# Patient Record
Sex: MALE | Race: WHITE | Employment: FULL TIME | URBAN - METROPOLITAN AREA
[De-identification: names, ages, dates, MRNs, and addresses within clinical notes are randomized per-mention and may not be internally consistent; named-entity substitution may affect disease eponyms.]

---

## 2017-06-01 PROBLEM — I20.0 UNSTABLE ANGINA (HCC): Status: ACTIVE | Noted: 2017-06-01

## 2017-09-19 ENCOUNTER — HOSPITAL ENCOUNTER (OUTPATIENT)
Dept: CT IMAGING | Age: 62
Discharge: HOME | End: 2017-09-19
Attending: SPECIALIST | Admitting: SPECIALIST

## 2017-09-19 DIAGNOSIS — R31.29 HEMATURIA, MICROSCOPIC: ICD-10-CM

## 2017-09-19 DIAGNOSIS — R31.29 OTHER MICROSCOPIC HEMATURIA: ICD-10-CM

## 2017-09-19 RX ORDER — SODIUM CHLORIDE 0.9 % (FLUSH) 0.9 %
10 SYRINGE (ML) INJECTION ONCE
Status: COMPLETED | OUTPATIENT
Start: 2017-09-19 | End: 2017-09-19

## 2017-09-19 RX ADMIN — Medication 10 ML: at 09:20

## 2018-09-21 ENCOUNTER — HOSPITAL ENCOUNTER (OUTPATIENT)
Dept: EMERGENCY DEPT | Age: 63
Setting detail: OBSERVATION
Discharge: HOME | End: 2018-09-22
Attending: EMERGENCY MEDICINE | Admitting: HOSPITALIST
Payer: COMMERCIAL

## 2018-09-21 DIAGNOSIS — R07.9 CHEST PAIN, UNSPECIFIED TYPE: Primary | ICD-10-CM

## 2018-09-21 LAB
ALBUMIN SERPL-MCNC: 4 GM/DL (ref 3.4–5)
ALP BLD-CCNC: 97 IU/L (ref 40–129)
ALT SERPL-CCNC: 29 U/L (ref 10–40)
ANION GAP SERPL CALCULATED.3IONS-SCNC: 11 MMOL/L (ref 4–16)
AST SERPL-CCNC: 20 IU/L (ref 15–37)
BASOPHILS ABSOLUTE: 0.1 K/CU MM
BASOPHILS RELATIVE PERCENT: 0.6 % (ref 0–1)
BILIRUB SERPL-MCNC: 0.5 MG/DL (ref 0–1)
BUN BLDV-MCNC: 19 MG/DL (ref 6–23)
CALCIUM SERPL-MCNC: 8.9 MG/DL (ref 8.3–10.6)
CHLORIDE BLD-SCNC: 103 MMOL/L (ref 99–110)
CO2: 25 MMOL/L (ref 21–32)
CREAT SERPL-MCNC: 1.2 MG/DL (ref 0.9–1.3)
DIFFERENTIAL TYPE: ABNORMAL
EOSINOPHILS ABSOLUTE: 0.1 K/CU MM
EOSINOPHILS RELATIVE PERCENT: 1.1 % (ref 0–3)
GFR AFRICAN AMERICAN: >60 ML/MIN/1.73M2
GFR NON-AFRICAN AMERICAN: >60 ML/MIN/1.73M2
GLUCOSE BLD-MCNC: 170 MG/DL (ref 70–99)
HCT VFR BLD CALC: 46.6 % (ref 42–52)
HEMOGLOBIN: 15.7 GM/DL (ref 13.5–18)
IMMATURE NEUTROPHIL %: 0.4 % (ref 0–0.43)
LYMPHOCYTES ABSOLUTE: 2.3 K/CU MM
LYMPHOCYTES RELATIVE PERCENT: 27.6 % (ref 24–44)
MCH RBC QN AUTO: 31.6 PG (ref 27–31)
MCHC RBC AUTO-ENTMCNC: 33.7 % (ref 32–36)
MCV RBC AUTO: 93.8 FL (ref 78–100)
MONOCYTES ABSOLUTE: 0.6 K/CU MM
MONOCYTES RELATIVE PERCENT: 7.2 % (ref 0–4)
NUCLEATED RBC %: 0 %
PDW BLD-RTO: 11.8 % (ref 11.7–14.9)
PLATELET # BLD: 247 K/CU MM (ref 140–440)
PMV BLD AUTO: 10 FL (ref 7.5–11.1)
POTASSIUM SERPL-SCNC: 4.2 MMOL/L (ref 3.5–5.1)
RBC # BLD: 4.97 M/CU MM (ref 4.6–6.2)
SEGMENTED NEUTROPHILS ABSOLUTE COUNT: 5.2 K/CU MM
SEGMENTED NEUTROPHILS RELATIVE PERCENT: 63.1 % (ref 36–66)
SODIUM BLD-SCNC: 139 MMOL/L (ref 135–145)
TOTAL IMMATURE NEUTOROPHIL: 0.03 K/CU MM
TOTAL NUCLEATED RBC: 0 K/CU MM
TOTAL PROTEIN: 7 GM/DL (ref 6.4–8.2)
TROPONIN T: <0.01 NG/ML
WBC # BLD: 8.2 K/CU MM (ref 4–10.5)

## 2018-09-21 PROCEDURE — 71046 X-RAY EXAM CHEST 2 VIEWS: CPT

## 2018-09-21 PROCEDURE — G0480 DRUG TEST DEF 1-7 CLASSES: HCPCS

## 2018-09-21 PROCEDURE — 9990 CHARGE CONVERSION

## 2018-09-21 PROCEDURE — 93005 ELECTROCARDIOGRAM TRACING: CPT

## 2018-09-21 PROCEDURE — 36415 COLL VENOUS BLD VENIPUNCTURE: CPT

## 2018-09-21 PROCEDURE — G0378 HOSPITAL OBSERVATION PER HR: HCPCS

## 2018-09-21 PROCEDURE — 85025 COMPLETE CBC W/AUTO DIFF WBC: CPT

## 2018-09-21 PROCEDURE — 99285 EMERGENCY DEPT VISIT HI MDM: CPT

## 2018-09-21 PROCEDURE — 80053 COMPREHEN METABOLIC PANEL: CPT

## 2018-09-21 PROCEDURE — S0028 INJECTION, FAMOTIDINE, 20 MG: HCPCS

## 2018-09-21 PROCEDURE — 84484 ASSAY OF TROPONIN QUANT: CPT

## 2018-09-21 RX ORDER — CARVEDILOL 12.5 MG/1
12.5 TABLET ORAL 2 TIMES DAILY WITH MEALS
Status: DISCONTINUED | OUTPATIENT
Start: 2018-09-22 | End: 2018-09-22 | Stop reason: HOSPADM

## 2018-09-21 RX ORDER — DOCUSATE SODIUM 100 MG/1
100 CAPSULE, LIQUID FILLED ORAL 2 TIMES DAILY PRN
Status: DISCONTINUED | OUTPATIENT
Start: 2018-09-21 | End: 2018-09-22 | Stop reason: HOSPADM

## 2018-09-21 RX ORDER — CLOPIDOGREL BISULFATE 75 MG/1
75 TABLET ORAL DAILY
Status: DISCONTINUED | OUTPATIENT
Start: 2018-09-22 | End: 2018-09-22 | Stop reason: HOSPADM

## 2018-09-21 RX ORDER — ASPIRIN 81 MG/1
81 TABLET, CHEWABLE ORAL DAILY
Status: DISCONTINUED | OUTPATIENT
Start: 2018-09-22 | End: 2018-09-21 | Stop reason: SDUPTHER

## 2018-09-21 RX ORDER — NITROGLYCERIN 0.4 MG/1
0.4 TABLET SUBLINGUAL EVERY 5 MIN PRN
Status: DISCONTINUED | OUTPATIENT
Start: 2018-09-21 | End: 2018-09-22 | Stop reason: HOSPADM

## 2018-09-21 RX ORDER — SODIUM CHLORIDE 0.9 % (FLUSH) 0.9 %
10 SYRINGE (ML) INJECTION PRN
Status: DISCONTINUED | OUTPATIENT
Start: 2018-09-21 | End: 2018-09-22 | Stop reason: HOSPADM

## 2018-09-21 RX ORDER — ATORVASTATIN CALCIUM 10 MG/1
10 TABLET, FILM COATED ORAL NIGHTLY
Status: DISCONTINUED | OUTPATIENT
Start: 2018-09-21 | End: 2018-09-22 | Stop reason: HOSPADM

## 2018-09-21 RX ORDER — ACETAMINOPHEN 325 MG/1
650 TABLET ORAL EVERY 4 HOURS PRN
Status: DISCONTINUED | OUTPATIENT
Start: 2018-09-21 | End: 2018-09-22 | Stop reason: HOSPADM

## 2018-09-21 RX ORDER — ASPIRIN 81 MG/1
324 TABLET, CHEWABLE ORAL ONCE
Status: COMPLETED | OUTPATIENT
Start: 2018-09-21 | End: 2018-09-21

## 2018-09-21 RX ORDER — IPRATROPIUM BROMIDE AND ALBUTEROL SULFATE 2.5; .5 MG/3ML; MG/3ML
1 SOLUTION RESPIRATORY (INHALATION)
Status: DISCONTINUED | OUTPATIENT
Start: 2018-09-22 | End: 2018-09-22

## 2018-09-21 RX ORDER — SODIUM CHLORIDE 9 MG/ML
INJECTION, SOLUTION INTRAVENOUS CONTINUOUS
Status: DISCONTINUED | OUTPATIENT
Start: 2018-09-21 | End: 2018-09-22 | Stop reason: HOSPADM

## 2018-09-21 RX ORDER — RANOLAZINE 500 MG/1
1000 TABLET, EXTENDED RELEASE ORAL 2 TIMES DAILY
Status: DISCONTINUED | OUTPATIENT
Start: 2018-09-21 | End: 2018-09-22 | Stop reason: HOSPADM

## 2018-09-21 RX ORDER — SODIUM CHLORIDE 0.9 % (FLUSH) 0.9 %
10 SYRINGE (ML) INJECTION EVERY 12 HOURS SCHEDULED
Status: DISCONTINUED | OUTPATIENT
Start: 2018-09-21 | End: 2018-09-22 | Stop reason: HOSPADM

## 2018-09-21 RX ORDER — ONDANSETRON 2 MG/ML
4 INJECTION INTRAMUSCULAR; INTRAVENOUS EVERY 6 HOURS PRN
Status: DISCONTINUED | OUTPATIENT
Start: 2018-09-21 | End: 2018-09-22 | Stop reason: HOSPADM

## 2018-09-21 RX ORDER — ASPIRIN 81 MG/1
81 TABLET ORAL DAILY
Status: DISCONTINUED | OUTPATIENT
Start: 2018-09-22 | End: 2018-09-22 | Stop reason: HOSPADM

## 2018-09-21 RX ADMIN — NITROGLYCERIN 0.4 MG: 0.4 TABLET SUBLINGUAL at 22:20

## 2018-09-21 RX ADMIN — Medication 10 ML: at 23:35

## 2018-09-21 RX ADMIN — ASPIRIN 324 MG: 81 TABLET, CHEWABLE ORAL at 22:20

## 2018-09-21 RX ADMIN — RANOLAZINE 1000 MG: 500 TABLET, FILM COATED, EXTENDED RELEASE ORAL at 23:35

## 2018-09-21 RX ADMIN — ATORVASTATIN CALCIUM 10 MG: 10 TABLET, FILM COATED ORAL at 23:35

## 2018-09-21 RX ADMIN — SODIUM CHLORIDE: 9 INJECTION, SOLUTION INTRAVENOUS at 23:35

## 2018-09-21 ASSESSMENT — PAIN SCALES - GENERAL
PAINLEVEL_OUTOF10: 3
PAINLEVEL_OUTOF10: 0

## 2018-09-22 VITALS
BODY MASS INDEX: 28.63 KG/M2 | SYSTOLIC BLOOD PRESSURE: 144 MMHG | HEIGHT: 70 IN | RESPIRATION RATE: 15 BRPM | OXYGEN SATURATION: 93 % | DIASTOLIC BLOOD PRESSURE: 83 MMHG | TEMPERATURE: 97.8 F | WEIGHT: 200 LBS | HEART RATE: 78 BPM

## 2018-09-22 LAB
ALBUMIN SERPL-MCNC: 3.8 GM/DL (ref 3.4–5)
ALP BLD-CCNC: 86 IU/L (ref 40–128)
ALT SERPL-CCNC: 26 U/L (ref 10–40)
AMPHETAMINES: NEGATIVE
ANION GAP SERPL CALCULATED.3IONS-SCNC: 12 MMOL/L (ref 4–16)
AST SERPL-CCNC: 19 IU/L (ref 15–37)
BARBITURATE SCREEN URINE: NEGATIVE
BASOPHILS ABSOLUTE: 0.1 K/CU MM
BASOPHILS RELATIVE PERCENT: 0.6 % (ref 0–1)
BENZODIAZEPINE SCREEN, URINE: NEGATIVE
BILIRUB SERPL-MCNC: 0.4 MG/DL (ref 0–1)
BUN BLDV-MCNC: 21 MG/DL (ref 6–23)
CALCIUM SERPL-MCNC: 8.7 MG/DL (ref 8.3–10.6)
CANNABINOID SCREEN URINE: NEGATIVE
CHLORIDE BLD-SCNC: 103 MMOL/L (ref 99–110)
CHOLESTEROL: 217 MG/DL
CO2: 24 MMOL/L (ref 21–32)
COCAINE METABOLITE: NEGATIVE
CREAT SERPL-MCNC: 1 MG/DL (ref 0.9–1.3)
D DIMER: <200 NG/ML(DDU)
DIFFERENTIAL TYPE: ABNORMAL
EKG ATRIAL RATE: 96 BPM
EKG DIAGNOSIS: NORMAL
EKG P AXIS: 43 DEGREES
EKG P-R INTERVAL: 162 MS
EKG Q-T INTERVAL: 364 MS
EKG QRS DURATION: 74 MS
EKG QTC CALCULATION (BAZETT): 459 MS
EKG R AXIS: 7 DEGREES
EKG T AXIS: 116 DEGREES
EKG VENTRICULAR RATE: 96 BPM
EOSINOPHILS ABSOLUTE: 0.1 K/CU MM
EOSINOPHILS RELATIVE PERCENT: 1.3 % (ref 0–3)
GFR AFRICAN AMERICAN: >60 ML/MIN/1.73M2
GFR NON-AFRICAN AMERICAN: >60 ML/MIN/1.73M2
GLUCOSE BLD-MCNC: 115 MG/DL (ref 70–99)
HCT VFR BLD CALC: 44 % (ref 42–52)
HDLC SERPL-MCNC: 45 MG/DL
HEMOGLOBIN: 14.7 GM/DL (ref 13.5–18)
IMMATURE NEUTROPHIL %: 1 % (ref 0–0.43)
LDL CHOLESTEROL DIRECT: 163 MG/DL
LYMPHOCYTES ABSOLUTE: 2.4 K/CU MM
LYMPHOCYTES RELATIVE PERCENT: 29 % (ref 24–44)
MCH RBC QN AUTO: 31.7 PG (ref 27–31)
MCHC RBC AUTO-ENTMCNC: 33.4 % (ref 32–36)
MCV RBC AUTO: 94.8 FL (ref 78–100)
MONOCYTES ABSOLUTE: 0.7 K/CU MM
MONOCYTES RELATIVE PERCENT: 8.1 % (ref 0–4)
NUCLEATED RBC %: 0 %
OPIATES, URINE: NEGATIVE
OXYCODONE: NEGATIVE
PDW BLD-RTO: 11.8 % (ref 11.7–14.9)
PHENCYCLIDINE, URINE: NEGATIVE
PLATELET # BLD: 238 K/CU MM (ref 140–440)
PMV BLD AUTO: 10.3 FL (ref 7.5–11.1)
POTASSIUM SERPL-SCNC: 4.1 MMOL/L (ref 3.5–5.1)
RBC # BLD: 4.64 M/CU MM (ref 4.6–6.2)
SEGMENTED NEUTROPHILS ABSOLUTE COUNT: 5 K/CU MM
SEGMENTED NEUTROPHILS RELATIVE PERCENT: 60 % (ref 36–66)
SODIUM BLD-SCNC: 139 MMOL/L (ref 135–145)
TOTAL IMMATURE NEUTOROPHIL: 0.08 K/CU MM
TOTAL NUCLEATED RBC: 0 K/CU MM
TOTAL PROTEIN: 5.8 GM/DL (ref 6.4–8.2)
TRIGL SERPL-MCNC: 154 MG/DL
TROPONIN T: <0.01 NG/ML
WBC # BLD: 8.3 K/CU MM (ref 4–10.5)

## 2018-09-22 PROCEDURE — 84484 ASSAY OF TROPONIN QUANT: CPT | Performed by: HOSPITALIST

## 2018-09-22 PROCEDURE — 6370000000 HC RX 637 (ALT 250 FOR IP): Performed by: INTERNAL MEDICINE

## 2018-09-22 PROCEDURE — 82962 GLUCOSE BLOOD TEST: CPT | Performed by: HOSPITALIST

## 2018-09-22 PROCEDURE — 36415 COLL VENOUS BLD VENIPUNCTURE: CPT | Performed by: HOSPITALIST

## 2018-09-22 PROCEDURE — 85379 FIBRIN DEGRADATION QUANT: CPT | Performed by: HOSPITALIST

## 2018-09-22 PROCEDURE — 83721 ASSAY OF BLOOD LIPOPROTEIN: CPT | Performed by: HOSPITALIST

## 2018-09-22 PROCEDURE — 96365 THER/PROPH/DIAG IV INF INIT: CPT

## 2018-09-22 PROCEDURE — 6370000000 HC RX 637 (ALT 250 FOR IP): Performed by: NURSE PRACTITIONER

## 2018-09-22 PROCEDURE — 85025 COMPLETE CBC W/AUTO DIFF WBC: CPT | Performed by: HOSPITALIST

## 2018-09-22 PROCEDURE — 80053 COMPREHEN METABOLIC PANEL: CPT | Performed by: HOSPITALIST

## 2018-09-22 PROCEDURE — 6360000002 HC RX W HCPCS: Performed by: NURSE PRACTITIONER

## 2018-09-22 PROCEDURE — 2580000003 HC RX 258: Performed by: NURSE PRACTITIONER

## 2018-09-22 PROCEDURE — G0378 HOSPITAL OBSERVATION PER HR: HCPCS

## 2018-09-22 RX ORDER — ISOSORBIDE MONONITRATE 60 MG/1
60 TABLET, EXTENDED RELEASE ORAL DAILY
Qty: 30 TABLET | Refills: 3 | Status: SHIPPED | OUTPATIENT
Start: 2018-09-23 | End: 2019-10-03 | Stop reason: CLARIF

## 2018-09-22 RX ORDER — IPRATROPIUM BROMIDE AND ALBUTEROL SULFATE 2.5; .5 MG/3ML; MG/3ML
1 SOLUTION RESPIRATORY (INHALATION) EVERY 4 HOURS PRN
Status: DISCONTINUED | OUTPATIENT
Start: 2018-09-22 | End: 2018-09-22 | Stop reason: HOSPADM

## 2018-09-22 RX ORDER — ISOSORBIDE MONONITRATE 60 MG/1
60 TABLET, EXTENDED RELEASE ORAL DAILY
Status: DISCONTINUED | OUTPATIENT
Start: 2018-09-22 | End: 2018-09-22 | Stop reason: HOSPADM

## 2018-09-22 RX ADMIN — ISOSORBIDE MONONITRATE 60 MG: 60 TABLET, EXTENDED RELEASE ORAL at 11:32

## 2018-09-22 RX ADMIN — ASPIRIN 81 MG: 81 TABLET, COATED ORAL at 11:33

## 2018-09-22 RX ADMIN — RANOLAZINE 1000 MG: 500 TABLET, FILM COATED, EXTENDED RELEASE ORAL at 11:32

## 2018-09-22 RX ADMIN — CARVEDILOL 12.5 MG: 12.5 TABLET, FILM COATED ORAL at 11:33

## 2018-09-22 RX ADMIN — HYDRALAZINE HYDROCHLORIDE 10 MG: 20 INJECTION INTRAMUSCULAR; INTRAVENOUS at 00:00

## 2018-09-22 RX ADMIN — CLOPIDOGREL BISULFATE 75 MG: 75 TABLET ORAL at 11:33

## 2018-09-22 ASSESSMENT — PAIN SCALES - GENERAL: PAINLEVEL_OUTOF10: 0

## 2018-09-22 NOTE — ED PROVIDER NOTES
Triage Chief Complaint:   Chest Pain; Shortness of Breath; and Fatigue    [place]:  Odette Li is a 66 y.o. male that presents with concern for chest pain, shortness of breath and fatigue. He is having chest pain and shortness of breath with exertion, has been having fatigue for quite some time recently. Did have chest pain at rest today, currently 3 out of 10, pressure over the mid chest.  He does not take his medications, he is supposed to be on blood pressure medications as well as Ranexa, Plavix and aspirin. He does not take these. He has been having the shortness of breath associated with it. No leg pain or swelling. Has had intermittent nausea, intermittent sweats. He has a history of cardiac bypass as well as a stent. He follows with the cardiologist group home [facility] with Dr. Eamon Isbell. No vomiting or diarrhea. No cough or fevers. Was at an [place] [id number] meeting and a friend told him he looked pale, he was not feeling well. ROS:  10 systems reviewed and negative except as above.      Past Medical History:   Diagnosis Date    CAD (coronary atherosclerotic disease)     Hyperlipidemia     Hypertension     Other disorders of kidney and ureter     kidney stone    Psychiatric problem      Past Surgical History:   Procedure Laterality Date    CARDIAC CATHETERIZATION      CARDIAC SURGERY      CORONARY ARTERY BYPASS GRAFT      2 vessel    HERNIA REPAIR       Family History   Problem Relation Age of Onset    High Blood Pressure Mother     Stroke Mother     Cancer Father     Early Death Brother     Heart Disease Brother     High Blood Pressure Brother     Arthritis Neg Hx     Asthma Neg Hx     Birth Defects Neg Hx     Depression Neg Hx     Diabetes Neg Hx     Hearing Loss Neg Hx     High Cholesterol Neg Hx     Kidney Disease Neg Hx     Learning Disabilities Neg Hx     Mental Illness Neg Hx     Mental Retardation Neg Hx     Miscarriages / Stillbirths Neg Hx     Substance Abuse Neg Hx     Vision Loss Peak Flow --       Pain Score --       Pain Loc --       Pain Edu? --       Excl. in [address]? --        My pulse ox interpretation is  normal    General appearance:  No acute distress. Skin:  Warm. Dry. Eye:  Extraocular movements intact. Ears, nose, mouth and throat:  Oral mucosa moist   Neck:  Trachea midline. Extremity:  No swelling. Normal ROM     Heart:  Regular rate and rhythm, normal S1 & S2, no extra heart sounds. Perfusion:  intact  Respiratory:  Lungs clear to auscultation bilaterally. Respirations nonlabored. Abdominal:  Normal bowel sounds. Soft. Nontender. Non distended.   Neurological:  Alert and oriented          Psychiatric:  Appropriate    I have reviewed and interpreted all of the currently available lab results from this visit (if applicable):  Results for orders placed or performed during the hospital encounter of 09/21/18   CBC auto diff   Result Value Ref Range    WBC 8.2 4.0 - 10.5 K/CU MM    RBC 4.97 4.6 - 6.2 M/CU MM    Hemoglobin 15.7 13.5 - 18.0 GM/DL    Hematocrit 46.6 42 - 52 %    MCV 93.8 78 - 100 FL    MCH 31.6 (H) 27 - 31 PG    MCHC 33.7 32.0 - 36.0 %    RDW 11.8 11.7 - 14.9 %    Platelets 506 617 - 655 K/CU MM    MPV 10.0 7.5 - 11.1 FL    Differential Type AUTOMATED DIFFERENTIAL     Segs Relative 63.1 36 - 66 %    Lymphocytes % 27.6 24 - 44 %    Monocytes % 7.2 (H) 0 - 4 %    Eosinophils % 1.1 0 - 3 %    Basophils % 0.6 0 - 1 %    Segs Absolute 5.2 K/CU MM    Lymphocytes # 2.3 K/CU MM    Monocytes # 0.6 K/CU MM    Eosinophils # 0.1 K/CU MM    Basophils # 0.1 K/CU MM    Nucleated RBC % 0.0 %    Total Nucleated RBC 0.0 K/CU MM    Total Immature Neutrophil 0.03 K/CU MM    Immature Neutrophil % 0.4 0 - 0.43 %   Troponin   Result Value Ref Range    Troponin T <0.010 <0.01 NG/ML      Radiographs (if obtained):  [] The following radiograph was interpreted by myself in the absence of a radiologist:   [x] Radiologist's Report Reviewed:  XR CHEST STANDARD (2 VW)   Final Result   1. No acute cardiopulmonary abnormality. 2. Stable elevation of the right hemidiaphragm. 3. Mild bibasilar atelectasis. EKG (if obtained): (All EKG's are interpreted by myself in the absence of a cardiologist)   Sinus rhythm with premature supraventricular complexes, rate 96bpm, normal intervals, no ST elevation. Chart review shows recent radiographs:  No results found. MDM:  53-year-old male history as above presents concern for chest pain. He is in no acute distress but slightly tachycardic and hypertensive on arrival.  His EKG is as above. He is very high risk for ACS, is not taking his medications that he is supposed to, has a history of a bypass and a stent. Given nitroglycerin with improvement, also given aspirin. His troponin is normal, left lites are normal.  Plan for admission for rule out ACS and evaluation by cardiology. Clinical Impression:  1. Chest pain, unspecified type      Disposition referral (if applicable):  No follow-up provider specified. Disposition medications (if applicable):  New Prescriptions    No medications on file       Comment: Please note this report has been produced using speech recognition software and may contain errors related to that system including errors in grammar, punctuation, and spelling, as well as words and phrases that may be inappropriate. If there are any questions or concerns please feel free to contact the dictating provider for clarification.        Kushal Koch MD  09/21/18 6895

## 2018-09-22 NOTE — CONSULTS
and well perfused, no pallor or cyanosis    Vascular exam:  : ,Femoral Arteries: 2+ and equal, Pedal Pulses: 2+ and equal     Abdomen: nontender      Neurological/Psychiatric:  nonfocal  Normal affect  Lab Review   Recent Labs      09/22/18 0103   WBC  8.3   HGB  14.7   HCT  44.0   PLT  238      Recent Labs      09/22/18 0103   NA  139   K  4.1   CL  103   CO2  24   BUN  21   CREATININE  1.0     Recent Labs      09/22/18 0103   AST  19   ALT  26   BILITOT  0.4   ALKPHOS  86     No results for input(s): TROPONINI in the last 72 hours.   No results found for: BNP  Lab Results   Component Value Date    INR 1.02 06/22/2017    PROTIME 11.6 06/22/2017       QUALITY MEASURES:  CAD:     EKG:    Chest Xray:    ECHO:  Labs, echo, meds reviewed  Assessment:   [unfilled]   Patient Active Problem List   Diagnosis Code    Chest pain R07.9    Hypertension I10    Debility R53.81    Unstable angina ([facility] [address] I20.0     [unfilled]  Problem List Items Addressed This Visit     Chest pain - Primary      1  Chest  Pain   Suggestive  Of  Cardiac  Chest  Pain  Angina  Remains  Chest  Pain free  2  htn  Well  Controlled  3  cabg  History  With  Patent  Grafts  To  Lad  And  pda  4  High  Chol  5  Copd  No  Active  wheezing  Recommendations:   Medical  Treatment  Added  imdur    2  OP  Nuclear  Stress  3  Advised  And  Encouraged  To  Take  meds  Especially  plavix       Westley Estrada MD, 9/22/2018 8:28 AM

## 2018-09-22 NOTE — ED NOTES
@2221 bed 3130 received clean and ready ER DAYANA chou notified of ready room     Frutoso Mailman  09/21/18 5155

## 2018-09-22 NOTE — PAYOR INFORMATION
Patient Michaela Ramachandran:  [de-identified]  Primary AUTH/CERT:    [facility] Name:   Eugene  Primary Insurance Plan Name:  [facility]  Primary Insurance Group Number:  [id number]  Primary Insurance Plan Type: B  Primary Insurance Policy Number:  [id number]

## 2018-09-22 NOTE — H&P
History and Physical      Name:  Lona Adkins /Age/Sex: 1955  (66 y.o. male)   MRN & CSN:  [id number] & [id number] Admission Date/Time: 2018  9:27 PM   Location:  ED30/ED30 PCP: Allyssa Schroeder MD       Hospital Day: 1        Admitting Physician: Dr. Clau Ragland     Assessment and Plan:   Lona Adkins is a 66 y.o. male who presents with  Chest Pain; Shortness of Breath; and Fatigue     Unstable Angina. Hx personal of CAD s/p CABG. [facility] (2017) with stent placement. Initial troponin neg. EKG shows no acute ischemia. Admit to Med/Surg under OBSERVATION status. Telemetry monitoring    Serial troponin level and repeat EKG in AM   Cardiology consult. Follows with Dr. Jas Wiley   Antiplatelet/BB/Statin/sl Nitro restarted       Essential hypertension- continue home antihypertensive regimen- Monitor BP trends. Added PRN hydralazine d/t uncontrolled BP trend in ED.  COPD- Add breathing treatments    Diet Cardiac NPO at midnight   DVT Prophylaxis [x] Lovenox, []  Heparin, [] SCDs, [] Ambulation  [] Long term AC   GI Prophylaxis [] PPI,  [x] H2 Blocker,  [] Carafate,  [] Diet/Tube Feeds   Code Status Full     Disposition Admit to observation. Patient plans to return home upon discharge   MDM [] Low, [x] Moderate,[]  High     -Patient assessment and plan discussed with supervising physician-  History of Present Illness:     Chief Complaint: Chest Pain; Shortness of Breath; and Fatigue    Lona Adkins is a 66 y.o. male who presents with above complaints. He states hx of COPD but HOOD and increased fatigue. Liana Hernandez Describes chest pain as left substernal \"sensation\" rated 3/10. Relieved with nitro. Reports similar symptoms to previous admission in 2017 when stents placed. He states noncompliance with all medications d/t financial reasons. Ten point ROS: reviewed negative, unless as noted in above HPI.     Objective:   No intake or output data in the 24 hours ending 18 0238     Vitals:   Vitals: visit:  Reviewed             Electronically signed by JAEL Gutierres CNP on 9/21/2018 at 10:17 PM

## 2018-09-22 NOTE — DISCHARGE SUMMARY
Discharge Summary    Name:  Jaquelin Saul /Age/Sex: 1955  (66 y.o. male)   MRN & CSN:  [id number] & [id number] Admission Date/Time: 2018 10:16 PM   Attending:  Isa Beltran MD Discharging Physician: Golden Smalls MD     HPI:     Jaquelin Saul is a 66 y.o. male who presents with above complaints. He states hx of COPD but HOOD and increased fatigue. Hector Yeboah Describes chest pain as left substernal \"sensation\" rated 3/10. Relieved with nitro. Reports similar symptoms to previous admission in 2017 when stents placed. He states noncompliance with all medications d/t financial reasons. Hospital Course:       Jaquelin Saul is a 66 y.o. male who presents with  Chest Pain; Shortness of Breath; and Fatigue   Serial troponin level and repeat EKG are negative  Patient evaluated by cardio and IMDUR added           The patient expressed appropriate understanding of and agreement with the discharge recommendations, medications, and plan. Consults this admission:  IP CONSULT TO HOSPITALIST  IP CONSULT TO CARDIOLOGY    Discharge Instruction:   Follow up appointments:   Cardiology for outpatient Nuclear stress test  Primary care physician:  within 2 weeks    Diet:  cardiac diet   Activity: activity as tolerated  Disposition: Discharged to:   [x]Home, []HHC, []SNF, []Acute Rehab, []Hospice     Condition on discharge: Stable    Discharge Medications:      Edison Funes  Medication Instructions [facility]:[id number]    Printed on:18 1342   Medication Information                      aspirin 81 MG EC tablet  Take 1 tablet by mouth daily.              atorvastatin (LIPITOR) 10 MG tablet  Take 1 tablet by mouth nightly             carvedilol (COREG) 12.5 MG tablet  Take 1 tablet by mouth 2 times daily (with meals)             clopidogrel (PLAVIX) 75 MG tablet  Take 1 tablet by mouth daily             isosorbide mononitrate (IMDUR) 60 MG extended release tablet  Take 1 tablet by mouth daily             Multiple

## 2019-10-03 ENCOUNTER — HOSPITAL ENCOUNTER (OUTPATIENT)
Dept: GENERAL RADIOLOGY | Age: 64
Discharge: HOME | End: 2019-10-03
Payer: COMMERCIAL

## 2019-10-03 ENCOUNTER — HOSPITAL ENCOUNTER (OUTPATIENT)
Age: 64
Discharge: HOME | End: 2019-10-03
Payer: COMMERCIAL

## 2019-10-03 DIAGNOSIS — J43.2 CENTRILOBULAR EMPHYSEMA (HCC): ICD-10-CM

## 2019-10-03 PROCEDURE — 71046 X-RAY EXAM CHEST 2 VIEWS: CPT

## 2019-10-28 ENCOUNTER — HOSPITAL ENCOUNTER (OUTPATIENT)
Dept: CT IMAGING | Age: 64
Discharge: HOME | End: 2019-10-28
Payer: COMMERCIAL

## 2019-10-28 DIAGNOSIS — J98.4 RESTRICTIVE LUNG DISEASE: ICD-10-CM

## 2019-10-28 PROCEDURE — 71250 CT THORAX DX C-: CPT

## 2019-11-10 ENCOUNTER — HOSPITAL ENCOUNTER (OUTPATIENT)
Dept: SLEEP CENTER | Age: 64
Discharge: HOME | End: 2019-11-10
Payer: COMMERCIAL

## 2019-11-10 DIAGNOSIS — R06.83 SNORING: ICD-10-CM

## 2019-11-10 DIAGNOSIS — G47.10 HYPERSOMNOLENCE: ICD-10-CM

## 2019-11-10 PROCEDURE — 95810 POLYSOM 6/> YRS 4/> PARAM: CPT

## 2019-11-15 LAB — STATUS: NORMAL

## 2019-11-29 ENCOUNTER — HOSPITAL ENCOUNTER (OUTPATIENT)
Dept: GENERAL RADIOLOGY | Age: 64
Discharge: HOME | End: 2019-11-29
Payer: COMMERCIAL

## 2019-11-29 DIAGNOSIS — J98.4 RESTRICTIVE LUNG DISEASE: ICD-10-CM

## 2019-11-29 PROCEDURE — 76000 FLUOROSCOPY <1 HR PHYS/QHP: CPT

## 2019-12-05 ENCOUNTER — HOSPITAL ENCOUNTER (OUTPATIENT)
Dept: SLEEP CENTER | Age: 64
Discharge: HOME | End: 2019-12-05
Payer: COMMERCIAL

## 2019-12-05 DIAGNOSIS — G47.33 OBSTRUCTIVE SLEEP APNEA: ICD-10-CM

## 2019-12-05 PROCEDURE — 95811 POLYSOM 6/>YRS CPAP 4/> PARM: CPT

## 2019-12-06 LAB — STATUS: NORMAL

## 2020-07-13 ENCOUNTER — HOSPITAL ENCOUNTER (EMERGENCY)
Age: 65
Discharge: HOME | End: 2020-07-14
Attending: EMERGENCY MEDICINE
Payer: COMMERCIAL

## 2020-07-13 ENCOUNTER — APPOINTMENT (OUTPATIENT)
Dept: GENERAL RADIOLOGY | Age: 65
End: 2020-07-13
Payer: COMMERCIAL

## 2020-07-13 VITALS
TEMPERATURE: 97.8 F | HEART RATE: 75 BPM | OXYGEN SATURATION: 95 % | SYSTOLIC BLOOD PRESSURE: 122 MMHG | DIASTOLIC BLOOD PRESSURE: 81 MMHG | RESPIRATION RATE: 18 BRPM

## 2020-07-13 LAB
ALBUMIN SERPL-MCNC: 4.1 GM/DL (ref 3.4–5)
ALCOHOL SCREEN SERUM: <0.01 %WT/VOL
ALP BLD-CCNC: 88 IU/L (ref 40–128)
ALT SERPL-CCNC: 22 U/L (ref 10–40)
AMPHETAMINES: NEGATIVE
ANION GAP SERPL CALCULATED.3IONS-SCNC: 10 MMOL/L (ref 4–16)
AST SERPL-CCNC: 16 IU/L (ref 15–37)
BARBITURATE SCREEN URINE: NEGATIVE
BASOPHILS ABSOLUTE: 0 K/CU MM
BASOPHILS RELATIVE PERCENT: 0.4 % (ref 0–1)
BENZODIAZEPINE SCREEN, URINE: NEGATIVE
BILIRUB SERPL-MCNC: 0.9 MG/DL (ref 0–1)
BUN BLDV-MCNC: 17 MG/DL (ref 6–23)
CALCIUM SERPL-MCNC: 9.3 MG/DL (ref 8.3–10.6)
CANNABINOID SCREEN URINE: NEGATIVE
CHLORIDE BLD-SCNC: 99 MMOL/L (ref 99–110)
CO2: 26 MMOL/L (ref 21–32)
COCAINE METABOLITE: NEGATIVE
CREAT SERPL-MCNC: 1.2 MG/DL (ref 0.9–1.3)
DIFFERENTIAL TYPE: ABNORMAL
EOSINOPHILS ABSOLUTE: 0 K/CU MM
EOSINOPHILS RELATIVE PERCENT: 0.4 % (ref 0–3)
GFR AFRICAN AMERICAN: >60 ML/MIN/1.73M2
GFR NON-AFRICAN AMERICAN: >60 ML/MIN/1.73M2
GLUCOSE BLD-MCNC: 190 MG/DL (ref 70–99)
HCT VFR BLD CALC: 50.1 % (ref 42–52)
HEMOGLOBIN: 16.2 GM/DL (ref 13.5–18)
IMMATURE NEUTROPHIL %: 0.4 % (ref 0–0.43)
LYMPHOCYTES ABSOLUTE: 1.9 K/CU MM
LYMPHOCYTES RELATIVE PERCENT: 18.4 % (ref 24–44)
MCH RBC QN AUTO: 31.4 PG (ref 27–31)
MCHC RBC AUTO-ENTMCNC: 32.3 % (ref 32–36)
MCV RBC AUTO: 97.1 FL (ref 78–100)
MONOCYTES ABSOLUTE: 0.6 K/CU MM
MONOCYTES RELATIVE PERCENT: 6.3 % (ref 0–4)
NUCLEATED RBC %: 0 %
OPIATES, URINE: NEGATIVE
OXYCODONE: NEGATIVE
PDW BLD-RTO: 12.1 % (ref 11.7–14.9)
PHENCYCLIDINE, URINE: NEGATIVE
PLATELET # BLD: 292 K/CU MM (ref 140–440)
PMV BLD AUTO: 10.1 FL (ref 7.5–11.1)
POTASSIUM SERPL-SCNC: 4.9 MMOL/L (ref 3.5–5.1)
PRO-BNP: 153.8 PG/ML
RBC # BLD: 5.16 M/CU MM (ref 4.6–6.2)
SEGMENTED NEUTROPHILS ABSOLUTE COUNT: 7.6 K/CU MM
SEGMENTED NEUTROPHILS RELATIVE PERCENT: 74.1 % (ref 36–66)
SODIUM BLD-SCNC: 135 MMOL/L (ref 135–145)
TOTAL IMMATURE NEUTOROPHIL: 0.04 K/CU MM
TOTAL NUCLEATED RBC: 0 K/CU MM
TOTAL PROTEIN: 7 GM/DL (ref 6.4–8.2)
TROPONIN T: <0.01 NG/ML
TROPONIN T: <0.01 NG/ML
WBC # BLD: 10.2 K/CU MM (ref 4–10.5)

## 2020-07-13 PROCEDURE — 93005 ELECTROCARDIOGRAM TRACING: CPT | Performed by: PHYSICIAN ASSISTANT

## 2020-07-13 PROCEDURE — 80053 COMPREHEN METABOLIC PANEL: CPT

## 2020-07-13 PROCEDURE — 85025 COMPLETE CBC W/AUTO DIFF WBC: CPT

## 2020-07-13 PROCEDURE — 83880 ASSAY OF NATRIURETIC PEPTIDE: CPT

## 2020-07-13 PROCEDURE — G0480 DRUG TEST DEF 1-7 CLASSES: HCPCS

## 2020-07-13 PROCEDURE — 93010 ELECTROCARDIOGRAM REPORT: CPT | Performed by: INTERNAL MEDICINE

## 2020-07-13 PROCEDURE — 6370000000 HC RX 637 (ALT 250 FOR IP): Performed by: PHYSICIAN ASSISTANT

## 2020-07-13 PROCEDURE — 71045 X-RAY EXAM CHEST 1 VIEW: CPT

## 2020-07-13 PROCEDURE — 80307 DRUG TEST PRSMV CHEM ANLYZR: CPT

## 2020-07-13 PROCEDURE — 84484 ASSAY OF TROPONIN QUANT: CPT

## 2020-07-13 PROCEDURE — 99285 EMERGENCY DEPT VISIT HI MDM: CPT

## 2020-07-13 PROCEDURE — 36415 COLL VENOUS BLD VENIPUNCTURE: CPT

## 2020-07-13 RX ORDER — ASPIRIN 81 MG/1
324 TABLET, CHEWABLE ORAL ONCE
Status: COMPLETED | OUTPATIENT
Start: 2020-07-13 | End: 2020-07-13

## 2020-07-13 RX ORDER — ASPIRIN 81 MG/1
324 TABLET, CHEWABLE ORAL ONCE
Status: DISCONTINUED | OUTPATIENT
Start: 2020-07-13 | End: 2020-07-14 | Stop reason: HOSPADM

## 2020-07-13 RX ADMIN — ASPIRIN 81 MG CHEWABLE TABLET 324 MG: 81 TABLET CHEWABLE at 07:56

## 2020-07-13 ASSESSMENT — PAIN DESCRIPTION - ORIENTATION: ORIENTATION: LEFT

## 2020-07-13 ASSESSMENT — PAIN SCALES - GENERAL
PAINLEVEL_OUTOF10: 6
PAINLEVEL_OUTOF10: 5

## 2020-07-13 ASSESSMENT — PAIN DESCRIPTION - PAIN TYPE
TYPE: ACUTE PAIN
TYPE: ACUTE PAIN

## 2020-07-13 ASSESSMENT — PAIN DESCRIPTION - LOCATION
LOCATION: CHEST
LOCATION: CHEST

## 2020-07-13 ASSESSMENT — PAIN DESCRIPTION - FREQUENCY: FREQUENCY: INTERMITTENT

## 2020-07-13 NOTE — ED NOTES
Pt informed this nurse that he is not suicidal but had a rough weekend and states he would like to have an action plan if need be. Pt stated he told a friend this weekend that he wished that he would go to sleep and not wake up. Pt said he proceeded to take a nap and felt somewhat better. No plan is in place and no action has bene taken per pt.       Beti Raza RN  07/13/20 1007

## 2020-07-13 NOTE — ED NOTES
Tatyana from [address] [facility] called at 200 and stated, Torey Tapia is still working on giving an update and we will call back when we have more information. \"     Colton Harm  07/13/20 1022

## 2020-07-13 NOTE — CONSULTS
[address] [place] [address], [address] [facility]                                  CONSULTATION    PATIENT NAME: EVELYN HOOVER                        :        1955  MED REC NO:   [id number]                          ROOM:       ED27  ACCOUNT NO:   [de-identified]                           ADMIT DATE: 2020  PROVIDER:     Marcelo Majano MD    CONSULT DATE:  2020    INDICATION:  Chest pain. HISTORY OF PRESENT ILLNESS:  This is a 30-year-old male patient with a  history of coronary artery disease status post CABG done. The patient  just got off of a relationship. He is depressed. He was at work today  and was having chest pain. Last week, he was off, he found out that the  girl he was seeing was cheating and left him so he was having mental  issues present. He was at work today, started having chest pain. Therefore he came to the hospital.  Right now, he is pain free. No  fever, no chills. No cough or sputum production. No other  or GI  complaints present. No syncopal episode is present. PAST MEDICAL HISTORY:  History of coronary artery disease status post  CABG done. He had a heart catheterization done back in 2017. Left main  was patent. LAD was 100% occluded. Mid diagonal had 80% stenosis. Circ had mild disease. OM had mild disease. OM-2 was stented. RCA had  80% stenosis, but SVG to PDA and LIMA to LAD was patent. History of coronary artery disease status post CABG done. Hypertension,  hyperlipidemia, depression, had suicidal ideations in the past.  Kidney  stones. PAST SURGICAL HISTORY:  LIMA to LAD and SVG to RCA graft. Both were  patent. SOCIAL HISTORY:  He does not smoke, does not drink. He works at  [employer]. 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 ALLERGIES:  NKDA. MEDICATIONS AT HOME:  He is on lisinopril 10 mg a day, Lipitor, Coreg,  and aspirin.     PHYSICAL EXAMINATION:  GENERAL:  The

## 2020-07-13 NOTE — ED NOTES
0945 called Mental Health. Spoke with Tatyana. Wandy Brand  07/13/20 0950  200 Raegan from [facility] in ED.      Wandy Brand  07/13/20 1235

## 2020-07-13 NOTE — CONSULTS
Dictated--[id number]  Hx of CAD s/p CABG  If delta trop Is negative, ok to d/c home  Stress test this week in office  Continue meds

## 2020-07-13 NOTE — ED NOTES
Pt arrived to ED for cc of CP. Pt states his cp is on  he left side with some tingling down left arm. Pt states some intermittent sob but states that's from his anxiety.       Sukumar Fried RN  07/13/20 2845

## 2020-07-13 NOTE — ED PROVIDER NOTES
EMERGENCY DEPARTMENT ENCOUNTER        PCP: Ras Garcia MD    [address] [facility]    Chief Complaint   Patient presents with    Chest Pain    Numbness     left arm             This patient was not evaluated by the attending physician. I have independently evaluated this patient. HPI    Hussein Grossman is a 63 y.o. male who presents with chest pain. Onset - acute/ approximately 1 hour prior to arrival to ED  Location -generalized to left chest  Duration -since onset  Character -sharp annoying pressure pain, pain does not migrate (TAD)  Aggravating/Alleviating factors -none known/none known  Activity at onset -rest  Associate symptoms -none, Pt denies any concurrent neurological symptoms (TAD)  Radiation -left arm  Severity -moderate    Patient attributes his symptoms to increased emotional stress recently related to significant other. He denies suicidal or homicidal ideation. REVIEW OF SYSTEMS    Constitutional:  Denies fever, chills. HENT:  Denies sore throat or ear pain   Cardiovascular:  +Chest Pain,  Denies palpitations,  Denies syncope, no extremity edema. Respiratory:    Denies cough, shortness of breath, or hemoptysis    GI:  Denies abdominal pain, nausea, vomiting, or diarrhea  :  Denies any urinary symptoms  Musculoskeletal:  Denies back pain, no extremity pain, swelling or discoloration. No pale or cold extremity  Skin:  Denies rash  Neurologic:  Denies changes in vision,  lightheadedness, dizziness, headache, focal weakness or sensory changes   Endocrine:  Denies polyuria or polydypsia   Lymphatic:  Denies swollen glands   Psych: See HPI.     All other review of systems are negative  See HPI and nursing notes for additional information         PAST MEDICAL & SURGICAL HISTORY    Past Medical History:   Diagnosis Date    CAD (coronary atherosclerotic disease)     Centrilobular emphysema ([facility] [place] [address] 10/3/2019    Hyperlipidemia     Hypertension     Other disorders of kidney and ureter     kidney stone    Psychiatric problem      Past Surgical History:   Procedure Laterality Date    CARDIAC CATHETERIZATION      CARDIAC SURGERY      CORONARY ARTERY BYPASS GRAFT      2 vessel    HERNIA REPAIR         CURRENT MEDICATIONS    Current Outpatient Rx   Medication Sig Dispense Refill    lisinopril (PRINIVIL;ZESTRIL) 10 MG tablet Take 10 mg by mouth daily      atorvastatin (LIPITOR) 10 MG tablet Take 1 tablet by mouth nightly 30 tablet 3    carvedilol (COREG) 12.5 MG tablet Take 1 tablet by mouth 2 times daily (with meals) 60 tablet 3    glycopyrrolate-formoterol (BEVESPI AEROSPHERE) 9-4.8 MCG/ACT AERO Inhale 2 puffs into the lungs 2 times daily 1 Inhaler 5    nitroGLYCERIN (NITROSTAT) 0.4 MG SL tablet up to max of 3 total doses. If no relief after 1 dose, call 911.  (Patient not taking: Reported on 12/5/2019) 25 tablet 0       ALLERGIES    No Known Allergies    SOCIAL & FAMILY HISTORY    Social History     Socioeconomic History    Marital status:      Spouse name: None    Number of children: None    Years of education: None    Highest education level: None   Occupational History    None   Social Needs    Financial resource strain: None    Food insecurity     Worry: None     Inability: None    Transportation needs     Medical: None     Non-medical: None   Tobacco Use    Smoking status: Former Smoker     Packs/day: 1.50     Years: 30.00     Pack years: 45.00     Types: Cigarettes     Last attempt to quit: 8/1/2016     Years since quitting: 3.9    Smokeless tobacco: Never Used   Substance and Sexual Activity    Alcohol use: No     Comment: 11/19/2015     Drug use: No    Sexual activity: Yes     Partners: Female   Lifestyle    Physical activity     Days per week: None     Minutes per session: None    Stress: None   Relationships    Social connections     Talks on phone: None     Gets together: None     Attends Religion service: None     Active member of club or organization: None Attends meetings of clubs or organizations: None     Relationship status: None    Intimate partner violence     Fear of current or ex partner: None     Emotionally abused: None     Physically abused: None     Forced sexual activity: None   Other Topics Concern    None   Social History Narrative    None     Family History   Problem Relation Age of Onset    High Blood Pressure Mother     Stroke Mother     Cancer Father     Early Death Brother     Heart Disease Brother     High Blood Pressure Brother     Arthritis Neg Hx     Asthma Neg Hx     Birth Defects Neg Hx     Depression Neg Hx     Diabetes Neg Hx     Hearing Loss Neg Hx     High Cholesterol Neg Hx     Kidney Disease Neg Hx     Learning Disabilities Neg Hx     Mental Illness Neg Hx     Mental Retardation Neg Hx     Miscarriages / Stillbirths Neg Hx     Substance Abuse Neg Hx     Vision Loss Neg Hx     Other Neg Hx        PHYSICAL EXAM    VITAL SIGNS: /81   Pulse 75   Temp 97.8 °F (36.6 °C) (Oral)   Resp 18   SpO2 95%      Constitutional:  Well developed, well nourished, no acute distress   HENT:  Atraumatic, moist mucus membranes  Neck/Lymphatics: supple, no JVD, no swollen nodes  Respiratory:  Lungs Clear, no retractions. Nonlabored breathing. Cardiovascular:  RRR,  no murmurs/rubs/gallops. No carotid bruits or murmurs heard in carotids. No JVD  Vascular:   Radial and femoral pulses palpable and reveal no discernible inequality    GI:  Soft, nontender, normal bowel sounds  Musculoskeletal:    There is no edema, asymmetry, or calf / thigh tenderness bilaterally. No cyanosis. No cool or pale-appearing limb.   Distal cap refill and pulses intact bilateral upper and lower extremities  Bilateral upper and lower extremity ROM intact without pain or obvious deficit    Integument:  Skin warm and dry, no petechiae   Neurologic:  Alert & oriented, normal speech    - Alert & oriented person, place, time, and situation, no speech difficulties or slurring.  - No obvious gross motor deficits  - Cranial nerves 2-12 grossly intact  - Negative meningeal signs.  - Sensation intact to light touch  - Strength 5/5 in upper and lower extremities bilaterally  - No truncal ataxia    Psych: Pleasant affect, no hallucinations          LABS:  Results for orders placed or performed during the hospital encounter of 07/13/20   CBC Auto Differential   Result Value Ref Range    WBC 10.2 4.0 - 10.5 K/CU MM    RBC 5.16 4.6 - 6.2 M/CU MM    Hemoglobin 16.2 13.5 - 18.0 GM/DL    Hematocrit 50.1 42 - 52 %    MCV 97.1 78 - 100 FL    MCH 31.4 (H) 27 - 31 PG    MCHC 32.3 32.0 - 36.0 %    RDW 12.1 11.7 - 14.9 %    Platelets 567 712 - 797 K/CU MM    MPV 10.1 7.5 - 11.1 FL    Differential Type AUTOMATED DIFFERENTIAL     Segs Relative 74.1 (H) 36 - 66 %    Lymphocytes % 18.4 (L) 24 - 44 %    Monocytes % 6.3 (H) 0 - 4 %    Eosinophils % 0.4 0 - 3 %    Basophils % 0.4 0 - 1 %    Segs Absolute 7.6 K/CU MM    Lymphocytes Absolute 1.9 K/CU MM    Monocytes Absolute 0.6 K/CU MM    Eosinophils Absolute 0.0 K/CU MM    Basophils Absolute 0.0 K/CU MM    Nucleated RBC % 0.0 %    Total Nucleated RBC 0.0 K/CU MM    Total Immature Neutrophil 0.04 K/CU MM    Immature Neutrophil % 0.4 0 - 0.43 %   Comprehensive Metabolic Panel   Result Value Ref Range    Sodium 135 135 - 145 MMOL/L    Potassium 4.9 3.5 - 5.1 MMOL/L    Chloride 99 99 - 110 mMol/L    CO2 26 21 - 32 MMOL/L    BUN 17 6 - 23 MG/DL    CREATININE 1.2 0.9 - 1.3 MG/DL    Glucose 190 (H) 70 - 99 MG/DL    Calcium 9.3 8.3 - 10.6 MG/DL    Alb 4.1 3.4 - 5.0 GM/DL    Total Protein 7.0 6.4 - 8.2 GM/DL    Total Bilirubin 0.9 0.0 - 1.0 MG/DL    ALT 22 10 - 40 U/L    AST 16 15 - 37 IU/L    Alkaline Phosphatase 88 40 - 128 IU/L    GFR Non-African American >60 >60 mL/min/1.73m2    GFR African American >60 >60 mL/min/1.73m2    Anion Gap 10 4 - 16   Troponin   Result Value Ref Range    Troponin T <0.010 <0.01 NG/ML   Brain Natriuretic Peptide Result Value Ref Range    Pro-.8 <300 PG/ML   Urine Drug Screen   Result Value Ref Range    Cannabinoid Scrn, Ur NEGATIVE NEGATIVE    Amphetamines NEGATIVE NEGATIVE    Cocaine Metabolite NEGATIVE NEGATIVE    Benzodiazepine Screen, Urine NEGATIVE NEGATIVE    Barbiturate Screen, Ur NEGATIVE NEGATIVE    Opiates, Urine NEGATIVE NEGATIVE    Phencyclidine, Urine NEGATIVE NEGATIVE    Oxycodone NEGATIVE NEGATIVE   Ethanol   Result Value Ref Range    Alcohol Scrn <0.01 <0.01 %WT/VOL   Troponin   Result Value Ref Range    Troponin T <0.010 <0.01 NG/ML   EKG 12 Lead   Result Value Ref Range    Ventricular Rate 73 BPM    Atrial Rate 73 BPM    P-R Interval 154 ms    QRS Duration 78 ms    Q-T Interval 398 ms    QTc Calculation (Bazett) 438 ms    P Axis 39 degrees    R Axis 29 degrees    T Axis 66 degrees    Diagnosis       Normal sinus rhythm  Nonspecific T wave abnormality  Abnormal ECG  When compared with ECG of 21-SEP-2018 20:56,  premature supraventricular complexes are no longer present  Criteria for Septal infarct are no longer present           EKG Interpretation  Please see ED physician's note for EKG interpretation - Dr. Duane Perez      RADIOLOGY/PROCEDURES      CXR:   XR CHEST PORTABLE   Final Result   Overall, stable portable chest compared with 10/03/2019 without acute focal   airspace consolidation. ED COURSE & MEDICAL DECISION MAKING      Patient presents as above with chest pain to which she attributes to stress related to significant other. Cardiac evaluation today does not reveal acute abnormality. 0930- I discussed patient case with cardiologist, Dr. Leyda Graves. He saw patient at bedside. He recommends repeat 3-hour troponin and if remains negative, patient to be discharged home to follow-up to his service. Patient understands and agrees with this plan. Given patient's extreme stress, he desires mental health evaluation today.   Mental health eval done today at bedside-see Frank Hidalgo note for detail. Patient does not exhibit self-harm thoughts or thoughts of hurting other and I feel patient is safe for discharge home with resources and follow-up established by mental health today. Strict return precautions regarding chest pain and anxiousness discussed in detail today with patient. Work note provided. Clinical  IMPRESSION    1. Chest pain, unspecified type    2. Anxiousness            Comment: Please note this report has been produced using speech recognition software and may contain errors related to that system including errors in grammar, punctuation, and spelling, as well as words and phrases that may be inappropriate. If there are any questions or concerns please feel free to contact the dictating provider for clarification.        Rudy, [place]  07/13/20 [id number]

## 2020-07-15 ENCOUNTER — CARE COORDINATION (OUTPATIENT)
Dept: CARE COORDINATION | Age: 65
End: 2020-07-15

## 2020-07-16 ENCOUNTER — CARE COORDINATION (OUTPATIENT)
Dept: CARE COORDINATION | Age: 65
End: 2020-07-16

## 2020-07-21 LAB
EKG ATRIAL RATE: 73 BPM
EKG DIAGNOSIS: NORMAL
EKG P AXIS: 39 DEGREES
EKG P-R INTERVAL: 154 MS
EKG Q-T INTERVAL: 398 MS
EKG QRS DURATION: 78 MS
EKG QTC CALCULATION (BAZETT): 438 MS
EKG R AXIS: 29 DEGREES
EKG T AXIS: 66 DEGREES
EKG VENTRICULAR RATE: 73 BPM

## 2021-05-02 ENCOUNTER — APPOINTMENT (OUTPATIENT)
Dept: GENERAL RADIOLOGY | Age: 66
DRG: 065 | End: 2021-05-02
Payer: COMMERCIAL

## 2021-05-02 ENCOUNTER — APPOINTMENT (OUTPATIENT)
Dept: CT IMAGING | Age: 66
DRG: 065 | End: 2021-05-02
Payer: COMMERCIAL

## 2021-05-02 ENCOUNTER — HOSPITAL ENCOUNTER (INPATIENT)
Age: 66
LOS: 2 days | Discharge: HOME | DRG: 065 | End: 2021-05-04
Attending: EMERGENCY MEDICINE | Admitting: INTERNAL MEDICINE
Payer: COMMERCIAL

## 2021-05-02 DIAGNOSIS — R29.898 RIGHT ARM WEAKNESS: ICD-10-CM

## 2021-05-02 DIAGNOSIS — R47.81 SLURRED SPEECH: Primary | ICD-10-CM

## 2021-05-02 DIAGNOSIS — R29.810 FACIAL DROOP: ICD-10-CM

## 2021-05-02 DIAGNOSIS — I63.9 ACUTE CVA (CEREBROVASCULAR ACCIDENT) (HCC): ICD-10-CM

## 2021-05-02 LAB
ALBUMIN SERPL-MCNC: 4.3 GM/DL (ref 3.4–5)
ALP BLD-CCNC: 104 IU/L (ref 40–129)
ALT SERPL-CCNC: 21 U/L (ref 10–40)
ANION GAP SERPL CALCULATED.3IONS-SCNC: 5 MMOL/L (ref 4–16)
AST SERPL-CCNC: 15 IU/L (ref 15–37)
BASOPHILS ABSOLUTE: 0.1 K/CU MM
BASOPHILS RELATIVE PERCENT: 0.7 % (ref 0–1)
BILIRUB SERPL-MCNC: 0.7 MG/DL (ref 0–1)
BUN BLDV-MCNC: 12 MG/DL (ref 6–23)
CALCIUM SERPL-MCNC: 9.7 MG/DL (ref 8.3–10.6)
CHLORIDE BLD-SCNC: 99 MMOL/L (ref 99–110)
CHP ED QC CHECK: YES
CHP ED QC CHECK: YES
CO2: 31 MMOL/L (ref 21–32)
CREAT SERPL-MCNC: 1 MG/DL (ref 0.9–1.3)
DIFFERENTIAL TYPE: ABNORMAL
EKG ATRIAL RATE: 62 BPM
EKG DIAGNOSIS: NORMAL
EKG P AXIS: 30 DEGREES
EKG P-R INTERVAL: 178 MS
EKG Q-T INTERVAL: 424 MS
EKG QRS DURATION: 78 MS
EKG QTC CALCULATION (BAZETT): 430 MS
EKG R AXIS: 15 DEGREES
EKG T AXIS: 55 DEGREES
EKG VENTRICULAR RATE: 62 BPM
EOSINOPHILS ABSOLUTE: 0.1 K/CU MM
EOSINOPHILS RELATIVE PERCENT: 1.5 % (ref 0–3)
GFR AFRICAN AMERICAN: >60 ML/MIN/1.73M2
GFR NON-AFRICAN AMERICAN: >60 ML/MIN/1.73M2
GLUCOSE BLD-MCNC: 202 MG/DL (ref 70–99)
GLUCOSE BLD-MCNC: 210 MG/DL
GLUCOSE BLD-MCNC: 210 MG/DL
GLUCOSE BLD-MCNC: 210 MG/DL (ref 70–99)
HCT VFR BLD CALC: 47.3 % (ref 42–52)
HEMOGLOBIN: 16.2 GM/DL (ref 13.5–18)
IMMATURE NEUTROPHIL %: 0.3 % (ref 0–0.43)
INR BLD: 0.94 INDEX
LYMPHOCYTES ABSOLUTE: 2.1 K/CU MM
LYMPHOCYTES RELATIVE PERCENT: 28.2 % (ref 24–44)
MCH RBC QN AUTO: 32.1 PG (ref 27–31)
MCHC RBC AUTO-ENTMCNC: 34.2 % (ref 32–36)
MCV RBC AUTO: 93.7 FL (ref 78–100)
MONOCYTES ABSOLUTE: 0.6 K/CU MM
MONOCYTES RELATIVE PERCENT: 8.2 % (ref 0–4)
NUCLEATED RBC %: 0 %
PDW BLD-RTO: 12 % (ref 11.7–14.9)
PLATELET # BLD: 221 K/CU MM (ref 140–440)
PMV BLD AUTO: 9.8 FL (ref 7.5–11.1)
POTASSIUM SERPL-SCNC: 4.9 MMOL/L (ref 3.5–5.1)
PROTHROMBIN TIME: 11.4 SECONDS (ref 11.7–14.5)
RBC # BLD: 5.05 M/CU MM (ref 4.6–6.2)
SEGMENTED NEUTROPHILS ABSOLUTE COUNT: 4.6 K/CU MM
SEGMENTED NEUTROPHILS RELATIVE PERCENT: 61.1 % (ref 36–66)
SODIUM BLD-SCNC: 135 MMOL/L (ref 135–145)
TOTAL IMMATURE NEUTOROPHIL: 0.02 K/CU MM
TOTAL NUCLEATED RBC: 0 K/CU MM
TOTAL PROTEIN: 6.7 GM/DL (ref 6.4–8.2)
TROPONIN T: <0.01 NG/ML
WBC # BLD: 7.5 K/CU MM (ref 4–10.5)

## 2021-05-02 PROCEDURE — 6370000000 HC RX 637 (ALT 250 FOR IP): Performed by: NURSE PRACTITIONER

## 2021-05-02 PROCEDURE — 94640 AIRWAY INHALATION TREATMENT: CPT

## 2021-05-02 PROCEDURE — 1200000000 HC SEMI PRIVATE

## 2021-05-02 PROCEDURE — 85610 PROTHROMBIN TIME: CPT

## 2021-05-02 PROCEDURE — 2700000000 HC OXYGEN THERAPY PER DAY

## 2021-05-02 PROCEDURE — 6370000000 HC RX 637 (ALT 250 FOR IP): Performed by: INTERNAL MEDICINE

## 2021-05-02 PROCEDURE — 99285 EMERGENCY DEPT VISIT HI MDM: CPT

## 2021-05-02 PROCEDURE — 2500000003 HC RX 250 WO HCPCS: Performed by: EMERGENCY MEDICINE

## 2021-05-02 PROCEDURE — 70450 CT HEAD/BRAIN W/O DYE: CPT

## 2021-05-02 PROCEDURE — 82962 GLUCOSE BLOOD TEST: CPT

## 2021-05-02 PROCEDURE — 84484 ASSAY OF TROPONIN QUANT: CPT

## 2021-05-02 PROCEDURE — 6370000000 HC RX 637 (ALT 250 FOR IP): Performed by: EMERGENCY MEDICINE

## 2021-05-02 PROCEDURE — 6360000004 HC RX CONTRAST MEDICATION: Performed by: EMERGENCY MEDICINE

## 2021-05-02 PROCEDURE — 2500000003 HC RX 250 WO HCPCS: Performed by: NURSE PRACTITIONER

## 2021-05-02 PROCEDURE — 94761 N-INVAS EAR/PLS OXIMETRY MLT: CPT

## 2021-05-02 PROCEDURE — 71045 X-RAY EXAM CHEST 1 VIEW: CPT

## 2021-05-02 PROCEDURE — 36415 COLL VENOUS BLD VENIPUNCTURE: CPT

## 2021-05-02 PROCEDURE — 85025 COMPLETE CBC W/AUTO DIFF WBC: CPT

## 2021-05-02 PROCEDURE — 2580000003 HC RX 258: Performed by: NURSE PRACTITIONER

## 2021-05-02 PROCEDURE — 93005 ELECTROCARDIOGRAM TRACING: CPT | Performed by: EMERGENCY MEDICINE

## 2021-05-02 PROCEDURE — 96374 THER/PROPH/DIAG INJ IV PUSH: CPT

## 2021-05-02 PROCEDURE — 93010 ELECTROCARDIOGRAM REPORT: CPT | Performed by: INTERNAL MEDICINE

## 2021-05-02 PROCEDURE — 80053 COMPREHEN METABOLIC PANEL: CPT

## 2021-05-02 PROCEDURE — 2580000003 HC RX 258: Performed by: INTERNAL MEDICINE

## 2021-05-02 PROCEDURE — 6360000002 HC RX W HCPCS: Performed by: NURSE PRACTITIONER

## 2021-05-02 PROCEDURE — 70496 CT ANGIOGRAPHY HEAD: CPT

## 2021-05-02 RX ORDER — LOSARTAN POTASSIUM 25 MG/1
25 TABLET ORAL DAILY
Status: ON HOLD | COMMUNITY
End: 2021-05-04 | Stop reason: SDUPTHER

## 2021-05-02 RX ORDER — SODIUM CHLORIDE 0.9 % (FLUSH) 0.9 %
5-40 SYRINGE (ML) INJECTION EVERY 12 HOURS SCHEDULED
Status: DISCONTINUED | OUTPATIENT
Start: 2021-05-02 | End: 2021-05-04 | Stop reason: HOSPADM

## 2021-05-02 RX ORDER — LABETALOL HYDROCHLORIDE 5 MG/ML
10 INJECTION, SOLUTION INTRAVENOUS EVERY 10 MIN PRN
Status: DISCONTINUED | OUTPATIENT
Start: 2021-05-02 | End: 2021-05-04 | Stop reason: HOSPADM

## 2021-05-02 RX ORDER — ROPINIROLE 1 MG/1
1 TABLET, FILM COATED ORAL NIGHTLY
Status: DISCONTINUED | OUTPATIENT
Start: 2021-05-02 | End: 2021-05-03

## 2021-05-02 RX ORDER — ESCITALOPRAM OXALATE 10 MG/1
10 TABLET ORAL DAILY
Status: DISCONTINUED | OUTPATIENT
Start: 2021-05-02 | End: 2021-05-02

## 2021-05-02 RX ORDER — NITROGLYCERIN 0.4 MG/1
0.4 TABLET SUBLINGUAL EVERY 5 MIN PRN
Status: DISCONTINUED | OUTPATIENT
Start: 2021-05-02 | End: 2021-05-04 | Stop reason: HOSPADM

## 2021-05-02 RX ORDER — ASPIRIN 81 MG/1
81 TABLET ORAL DAILY
Status: ON HOLD | COMMUNITY
End: 2021-05-04 | Stop reason: HOSPADM

## 2021-05-02 RX ORDER — SODIUM CHLORIDE 9 MG/ML
25 INJECTION, SOLUTION INTRAVENOUS PRN
Status: DISCONTINUED | OUTPATIENT
Start: 2021-05-02 | End: 2021-05-04 | Stop reason: HOSPADM

## 2021-05-02 RX ORDER — SODIUM CHLORIDE 0.9 % (FLUSH) 0.9 %
5-40 SYRINGE (ML) INJECTION PRN
Status: DISCONTINUED | OUTPATIENT
Start: 2021-05-02 | End: 2021-05-04 | Stop reason: HOSPADM

## 2021-05-02 RX ORDER — ALBUTEROL SULFATE 90 UG/1
2 AEROSOL, METERED RESPIRATORY (INHALATION) EVERY 6 HOURS PRN
Status: DISCONTINUED | OUTPATIENT
Start: 2021-05-02 | End: 2021-05-04 | Stop reason: HOSPADM

## 2021-05-02 RX ORDER — ESCITALOPRAM OXALATE 10 MG/1
10 TABLET ORAL DAILY
COMMUNITY
End: 2022-05-25 | Stop reason: CLARIF

## 2021-05-02 RX ORDER — ROPINIROLE 1 MG/1
1 TABLET, FILM COATED ORAL NIGHTLY
COMMUNITY

## 2021-05-02 RX ORDER — PROMETHAZINE HYDROCHLORIDE 25 MG/1
12.5 TABLET ORAL EVERY 6 HOURS PRN
Status: DISCONTINUED | OUTPATIENT
Start: 2021-05-02 | End: 2021-05-04 | Stop reason: HOSPADM

## 2021-05-02 RX ORDER — ASPIRIN 81 MG/1
81 TABLET ORAL DAILY
Status: DISCONTINUED | OUTPATIENT
Start: 2021-05-02 | End: 2021-05-02 | Stop reason: SDUPTHER

## 2021-05-02 RX ORDER — LABETALOL HYDROCHLORIDE 5 MG/ML
10 INJECTION, SOLUTION INTRAVENOUS ONCE
Status: COMPLETED | OUTPATIENT
Start: 2021-05-02 | End: 2021-05-02

## 2021-05-02 RX ORDER — SODIUM CHLORIDE 450 MG/100ML
INJECTION, SOLUTION INTRAVENOUS CONTINUOUS
Status: DISCONTINUED | OUTPATIENT
Start: 2021-05-02 | End: 2021-05-03

## 2021-05-02 RX ORDER — ESCITALOPRAM OXALATE 10 MG/1
10 TABLET ORAL NIGHTLY
Status: DISCONTINUED | OUTPATIENT
Start: 2021-05-02 | End: 2021-05-04 | Stop reason: HOSPADM

## 2021-05-02 RX ORDER — ASPIRIN 300 MG/1
300 SUPPOSITORY RECTAL DAILY
Status: DISCONTINUED | OUTPATIENT
Start: 2021-05-02 | End: 2021-05-04 | Stop reason: HOSPADM

## 2021-05-02 RX ORDER — ATORVASTATIN CALCIUM 80 MG/1
80 TABLET, FILM COATED ORAL NIGHTLY
Status: DISCONTINUED | OUTPATIENT
Start: 2021-05-02 | End: 2021-05-04 | Stop reason: HOSPADM

## 2021-05-02 RX ORDER — POLYETHYLENE GLYCOL 3350 17 G/17G
17 POWDER, FOR SOLUTION ORAL DAILY PRN
Status: DISCONTINUED | OUTPATIENT
Start: 2021-05-02 | End: 2021-05-04 | Stop reason: HOSPADM

## 2021-05-02 RX ORDER — ASPIRIN 81 MG/1
324 TABLET, CHEWABLE ORAL ONCE
Status: COMPLETED | OUTPATIENT
Start: 2021-05-02 | End: 2021-05-02

## 2021-05-02 RX ORDER — ASPIRIN 81 MG/1
81 TABLET ORAL DAILY
Status: DISCONTINUED | OUTPATIENT
Start: 2021-05-02 | End: 2021-05-04 | Stop reason: HOSPADM

## 2021-05-02 RX ORDER — CLOPIDOGREL BISULFATE 75 MG/1
75 TABLET ORAL DAILY
Status: DISCONTINUED | OUTPATIENT
Start: 2021-05-02 | End: 2021-05-04 | Stop reason: HOSPADM

## 2021-05-02 RX ORDER — ALBUTEROL SULFATE 90 UG/1
2 AEROSOL, METERED RESPIRATORY (INHALATION) EVERY 6 HOURS PRN
COMMUNITY

## 2021-05-02 RX ORDER — ONDANSETRON 2 MG/ML
4 INJECTION INTRAMUSCULAR; INTRAVENOUS EVERY 6 HOURS PRN
Status: DISCONTINUED | OUTPATIENT
Start: 2021-05-02 | End: 2021-05-04 | Stop reason: HOSPADM

## 2021-05-02 RX ADMIN — SODIUM CHLORIDE: 4.5 INJECTION, SOLUTION INTRAVENOUS at 18:01

## 2021-05-02 RX ADMIN — LABETALOL HYDROCHLORIDE 10 MG: 5 INJECTION, SOLUTION INTRAVENOUS at 09:59

## 2021-05-02 RX ADMIN — IOPAMIDOL 80 ML: 755 INJECTION, SOLUTION INTRAVENOUS at 09:55

## 2021-05-02 RX ADMIN — SODIUM CHLORIDE, PRESERVATIVE FREE 10 ML: 5 INJECTION INTRAVENOUS at 20:42

## 2021-05-02 RX ADMIN — ENOXAPARIN SODIUM 40 MG: 40 INJECTION SUBCUTANEOUS at 18:00

## 2021-05-02 RX ADMIN — ASPIRIN 324 MG: 81 TABLET, CHEWABLE ORAL at 10:29

## 2021-05-02 RX ADMIN — ALBUTEROL SULFATE 2 PUFF: 90 AEROSOL, METERED RESPIRATORY (INHALATION) at 21:23

## 2021-05-02 RX ADMIN — ATORVASTATIN CALCIUM 80 MG: 80 TABLET, FILM COATED ORAL at 20:44

## 2021-05-02 RX ADMIN — ROPINIROLE HYDROCHLORIDE 1 MG: 1 TABLET, FILM COATED ORAL at 20:42

## 2021-05-02 RX ADMIN — LABETALOL HYDROCHLORIDE 10 MG: 5 INJECTION, SOLUTION INTRAVENOUS at 21:21

## 2021-05-02 RX ADMIN — ESCITALOPRAM OXALATE 10 MG: 10 TABLET ORAL at 20:42

## 2021-05-02 NOTE — ED NOTES
Dr Jamie Vasques at bedside talking to Dr Petrona Osborne on Robot about plan of care. Pt updated on plan of care.  Wife at bedside     Andrea Dsouza, DAYANA  05/02/21 [id number]

## 2021-05-02 NOTE — ED NOTES
Hand off NIH stroke scale done with DAYANA Smith at bedside.  Care transferred at this time     Jenny Lee RN  05/02/21 [id number]

## 2021-05-02 NOTE — CODE DOCUMENTATION
During evaluation with Dr Arielle Vela on stroke robot it was found patients LKW was 0030 this morning when he went to bed. Pt reports he first noticed difficulty with ambulation and balance when he got out of bed and noticed he was having trouble getting out of bed on his own. Pt then spilt coffee down his shirt while trying to drink his coffee and significant other reports he had trouble holding the coffee cup in his right hand.  Pt is right handed

## 2021-05-02 NOTE — ED PROVIDER NOTES
Triage Chief Complaint:   Cerebrovascular Accident (stroke alert called by EMS), Extremity Weakness (right side), and Aphasia    [place]:  Federica Montano is a 63 y.o. male that presents to the emergency department with right arm weakness, slurred speech, facial droop. Patient's last known well is reported as 8:45am, per patient . Patient is awake, alert, oriented x4. States he woke up this morning in his usual state of health. He states he walked the dog. States briefly after that around 8:45 AM he noticed difficulty using his right arm and dropped his coffee. States his speech is slurred. He states it was difficult to walk with his right leg but it does not feel as weak as his right arm. No chest pain or shortness of breath. No fevers or chills. Denies any headache. No dizziness. No confusion. Denies any pain. Patient states he does have a history of a previous open heart surgery, CAD, emphysema, hypertension, hyperlipidemia. Patient states he was taken off lisinopril about 2 to 3 weeks ago and placed on Coreg. He has not yet taken his morning medications. ROS:  At least 10 systems reviewed and otherwise acutely negative except as in the [address]     Past Medical History:   Diagnosis Date    CAD (coronary atherosclerotic disease)     Centrilobular emphysema ([facility] [address] 10/3/2019    Hyperlipidemia     Hypertension     Other disorders of kidney and ureter     kidney stone    Psychiatric problem      Past Surgical History:   Procedure Laterality Date    CARDIAC CATHETERIZATION      CARDIAC SURGERY      CORONARY ANGIOPLASTY WITH STENT PLACEMENT      reports he has 1 cardiac stent    CORONARY ARTERY BYPASS GRAFT      2 vessel    HERNIA REPAIR       Family History   Problem Relation Age of Onset    High Blood Pressure Mother     Stroke Mother     Cancer Father     Early Death Brother     Heart Disease Brother     High Blood Pressure Brother     Arthritis Neg Hx     Asthma Neg Hx     Birth Defects Neg Hx     Depression Neg Hx     Diabetes Neg Hx     Hearing Loss Neg Hx     High Cholesterol Neg Hx     Kidney Disease Neg Hx     Learning Disabilities Neg Hx     Mental Illness Neg Hx     Mental Retardation Neg Hx     Miscarriages / Stillbirths Neg Hx     Substance Abuse Neg Hx     Vision Loss Neg Hx     Other Neg Hx      Social History     Socioeconomic History    Marital status:      Spouse name: Not on file    Number of children: Not on file    Years of education: Not on file    Highest education level: Not on file   Occupational History    Not on file   Social Needs    Financial resource strain: Not on file    Food insecurity     Worry: Not on file     Inability: Not on file    Transportation needs     Medical: Not on file     Non-medical: Not on file   Tobacco Use    Smoking status: Former Smoker     Packs/day: 1.50     Years: 30.00     Pack years: 45.00     Types: Cigarettes     Quit date: 2016     Years since quittin.7    Smokeless tobacco: Never Used   Substance and Sexual Activity    Alcohol use: No     Comment: 2015     Drug use: No    Sexual activity: Yes     Partners: Female   Lifestyle    Physical activity     Days per week: Not on file     Minutes per session: Not on file    Stress: Not on file   Relationships    Social connections     Talks on phone: Not on file     Gets together: Not on file     Attends Protestant service: Not on file     Active member of club or organization: Not on file     Attends meetings of clubs or organizations: Not on file     Relationship status: Not on file    Intimate partner violence     Fear of current or ex partner: Not on file     Emotionally abused: Not on file     Physically abused: Not on file     Forced sexual activity: Not on file   Other Topics Concern    Not on file   Social History Narrative    Not on file     No current facility-administered medications for this encounter.       Current Outpatient Medications   Medication Sig Dispense Refill    escitalopram (LEXAPRO) 10 MG tablet Take 10 mg by mouth daily      rOPINIRole (REQUIP) 1 MG tablet Take 1 mg by mouth nightly      aspirin 81 MG EC tablet Take 81 mg by mouth daily      losartan (COZAAR) 25 MG tablet Take 25 mg by mouth daily      albuterol sulfate HFA (VENTOLIN HFA) 108 (90 Base) MCG/ACT inhaler Inhale 2 puffs into the lungs every 6 hours as needed for Wheezing      glycopyrrolate-formoterol (BEVESPI AEROSPHERE) 9-4.8 MCG/ACT AERO Inhale 2 puffs into the lungs 2 times daily 1 Inhaler 5    atorvastatin (LIPITOR) 10 MG tablet Take 1 tablet by mouth nightly 30 tablet 3    carvedilol (COREG) 12.5 MG tablet Take 1 tablet by mouth 2 times daily (with meals) 60 tablet 3    lisinopril (PRINIVIL;ZESTRIL) 10 MG tablet Take 10 mg by mouth daily      nitroGLYCERIN (NITROSTAT) 0.4 MG SL tablet up to max of 3 total doses. If no relief after 1 dose, call 911. (Patient not taking: Reported on 12/5/2019) 25 tablet 0     No Known Allergies    Nursing Notes Reviewed    Physical Exam:  ED Triage Vitals   Enc Vitals Group      BP       Pulse       Resp       Temp       Temp src       SpO2       Weight       Height       Head Circumference       Peak Flow       Pain Score       Pain Loc       Pain Edu? Excl. in [address]? GENERAL APPEARANCE: Awake and alert. Cooperative. Patient with slurred speech, right-sided facial droop. HEAD: Normocephalic. Right sided facial droop. EYES: EOM's grossly intact. Sclera anicteric. ENT: Mucous membranes are moist. Tolerates saliva. No trismus. NECK: Supple. No meningismus. Trachea midline. HEART: RRR. Radial pulses 2+. LUNGS: Respirations unlabored. CTAB. No wheezing or stridor. No respiratory distress. ABDOMEN: Soft. Non-tender. No guarding or rebound. Normal bowel sounds. EXTREMITIES: No acute deformities. No pitting edema. SKIN: Warm and dry.   NEUROLOGICAL:   Chela Lilly NIH Stroke Scale at 10:36 AM is: Level of Consciousness:  0 - alert; keenly responsive    LOC Questions:  0 - answers both questions correctly    LOC Commands:  0 - performs both tasks correctly    Best Gaze:  0 - normal    Visual Fields:  0 - no visual loss    Facial Palsy:  1 - minor paralysis (flattened nasolabial fold, asymmetric on smiling)    Motor-Arm-Left:  0 - no drift, limb holds 90 (or 45) degrees for full 10 seconds    Motor-Leg-Left:  0 - no drift; leg holds 30 degree position for full 5 seconds    Motor-Arm-Right:  1 - drift, limb holds 90 (or 45) degrees but drifts down before full 10 seconds: does not hit bed    Motor-Leg-Right:  0 - no drift; leg holds 30 degree position for full 5 seconds    Limb Ataxia:  0 - absent    Sensory:  1 - mild to moderate sensory loss; patient feels pinprick is less sharp or is dull on the affected side; there is a loss of superficial pain with pinprick but patient is aware of being touched     Best Language:  0 - no aphasia, normal    Dysarthria:  1 - mild to moderate, patient slurs at least some words and at worst, can be understood with some difficulty    Extinction and Inattention:  0 - no abnormality  PSYCHIATRIC: Normal mood.     I have reviewed and interpreted all of the currently available lab results from this visit (if applicable):  Results for orders placed or performed during the hospital encounter of 05/02/21   CBC Auto Differential   Result Value Ref Range    WBC 7.5 4.0 - 10.5 K/CU MM    RBC 5.05 4.6 - 6.2 M/CU MM    Hemoglobin 16.2 13.5 - 18.0 GM/DL    Hematocrit 47.3 42 - 52 %    MCV 93.7 78 - 100 FL    MCH 32.1 (H) 27 - 31 PG    MCHC 34.2 32.0 - 36.0 %    RDW 12.0 11.7 - 14.9 %    Platelets 717 198 - 585 K/CU MM    MPV 9.8 7.5 - 11.1 FL    Differential Type AUTOMATED DIFFERENTIAL     Segs Relative 61.1 36 - 66 %    Lymphocytes % 28.2 24 - 44 %    Monocytes % 8.2 (H) 0 - 4 %    Eosinophils % 1.5 0 - 3 %    Basophils % 0.7 0 - 1 %    Segs Absolute 4.6 K/CU MM    Lymphocytes Absolute 2.1 K/CU MM    Monocytes Absolute 0.6 K/CU MM    Eosinophils Absolute 0.1 K/CU MM    Basophils Absolute 0.1 K/CU MM    Nucleated RBC % 0.0 %    Total Nucleated RBC 0.0 K/CU MM    Total Immature Neutrophil 0.02 K/CU MM    Immature Neutrophil % 0.3 0 - 0.43 %   Comprehensive Metabolic Panel w/ Reflex to MG   Result Value Ref Range    Sodium 135 135 - 145 MMOL/L    Potassium 4.9 3.5 - 5.1 MMOL/L    Chloride 99 99 - 110 mMol/L    CO2 31 21 - 32 MMOL/L    BUN 12 6 - 23 MG/DL    CREATININE 1.0 0.9 - 1.3 MG/DL    Glucose 202 (H) 70 - 99 MG/DL    Calcium 9.7 8.3 - 10.6 MG/DL    Albumin 4.3 3.4 - 5.0 GM/DL    Total Protein 6.7 6.4 - 8.2 GM/DL    Total Bilirubin 0.7 0.0 - 1.0 MG/DL    ALT 21 10 - 40 U/L    AST 15 15 - 37 IU/L    Alkaline Phosphatase 104 40 - 129 IU/L    GFR Non-African American >60 >60 mL/min/1.73m2    GFR African American >60 >60 mL/min/1.73m2    Anion Gap 5 4 - 16   Troponin   Result Value Ref Range    Troponin T <0.010 <0.01 NG/ML   Protime-INR   Result Value Ref Range    Protime 11.4 (L) 11.7 - 14.5 SECONDS    INR 0.94 INDEX   POCT Glucose   Result Value Ref Range    POC Glucose 210 (H) 70 - 99 MG/DL   EKG 12 Lead   Result Value Ref Range    Ventricular Rate 62 BPM    Atrial Rate 62 BPM    P-R Interval 178 ms    QRS Duration 78 ms    Q-T Interval 424 ms    QTc Calculation (Bazett) 430 ms    P Axis 30 degrees    R Axis 15 degrees    T Axis 55 degrees    Diagnosis       Normal sinus rhythm  Normal ECG  When compared with ECG of 13-JUL-2020 07:38,  Nonspecific T wave abnormality, improved in Lateral leads          Radiographs (if obtained):  [] The following radiograph was interpreted by myself in the absence of a radiologist:  [x] Radiologist's Report Reviewed:     CTA HEAD NECK W CONTRAST (Final result)  Result time 05/02/21 10:24:41  Final result by Sherral Hatchet, MD (05/02/21 10:24:41)                Impression:    50% stenosis at the origin of the right subclavian artery.      40% stenosis at the origin of the right internal carotid artery. 30% stenosis at the origin of the left internal carotid artery. No acute abnormality or flow-limiting stenosis of the major arteries of the   head. Narrative:    EXAMINATION:   CTA OF THE HEAD AND NECK WITH CONTRAST 5/2/2021 9:54 am:     TECHNIQUE:   CTA of the head and neck was performed with the administration of intravenous   contrast. Multiplanar reformatted images are provided for review.  MIP images   are provided for review. Stenosis of the internal carotid arteries measured   using NASCET criteria. Dose modulation, iterative reconstruction, and/or   weight based adjustment of the mA/kV was utilized to reduce the radiation   dose to as low as reasonably achievable. COMPARISON:   Noncontrast CT head from earlier today     HISTORY:   ORDERING SYSTEM PROVIDED HISTORY: right sided weakness, slurred speech   TECHNOLOGIST PROVIDED HISTORY:   Reason for exam:->right sided weakness, slurred speech   Decision Support Exception - unselect if not a suspected or confirmed   emergency medical condition->Emergency Medical Condition (MA)   Reason for Exam: right sided weakness, slurred speech   Acuity: Acute   Type of Exam: Initial   Additional signs and symptoms: 80ml Isovue 370     FINDINGS:     CTA NECK:     AORTIC ARCH/ARCH VESSELS: No dissection or arterial injury.  There is 50%   stenosis at the origin of the right subclavian artery.  No significant   stenosis of the brachiocephalic or remainder of the bilateral subclavian   arteries. CAROTID ARTERIES: There is 40% stenosis at the origin of the right internal   carotid artery.  There is 30% stenosis at the origin of the left internal   carotid artery.  No acute abnormality or flow-limiting stenosis in the   remainder of the bilateral internal or common carotid arteries by NASCET   criteria. VERTEBRAL ARTERIES: No dissection, arterial injury, or significant stenosis.      SOFT TISSUES: The patient is status post median sternotomy.  There is   ground-glass attenuation at the lung apices, may be related to   underdistention versus pneumonitis.  No cervical or superior mediastinal   lymphadenopathy.  The larynx and pharynx are unremarkable.  No acute   abnormality of the salivary and thyroid glands. BONES: No acute osseous abnormality. CTA HEAD:     ANTERIOR CIRCULATION: No significant stenosis of the intracranial internal   carotid, anterior cerebral, or middle cerebral arteries. No aneurysm. POSTERIOR CIRCULATION: No significant stenosis of the vertebral, basilar, or   posterior cerebral arteries. No aneurysm. OTHER: No dural venous sinus thrombosis on this non-dedicated study. BRAIN: No mass effect or midline shift. No extra-axial fluid collection. The   gray-white differentiation is maintained.                     CT HEAD WO CONTRAST (Final result)  Result time 05/02/21 09:50:27  Final result by Shad Dubois MD (05/02/21 09:50:27)                Impression:    No acute intracranial abnormality. Findings were given to Dr. Silvia Piper in the ED on 5/2/2021 at 9:49 a.m. Narrative:    EXAMINATION:   CT OF THE HEAD WITHOUT CONTRAST  5/2/2021 9:41 am     TECHNIQUE:   CT of the head was performed without the administration of intravenous   contrast. Dose modulation, iterative reconstruction, and/or weight based   adjustment of the mA/kV was utilized to reduce the radiation dose to as low   as reasonably achievable. COMPARISON:   08/03/2015     HISTORY:   Acute right-sided weakness and slurred speech. FINDINGS:   Image quality mildly degraded by motion artifact. BRAIN/VENTRICLES: No acute intracranial hemorrhage, mass effect or midline   shift.  No abnormal extra-axial fluid collection.  The gray-white   differentiation is maintained without evidence of an acute infarct.  No   hydrocephalus. ORBITS: The visualized portion of the orbits demonstrate no acute abnormality. SINUSES: The visualized paranasal sinuses and mastoid air cells demonstrate   no acute abnormality. SOFT TISSUES/SKULL:  No acute abnormality of the visualized skull or soft   tissues.                       EKG (if obtained): (All EKG's are interpreted by myself in the absence of a cardiologist)  The Ekg interpreted by me shows  normal sinus rhythm with a rate of 62  Axis is   Normal  QTc is  normal  Intervals and Durations are unremarkable. ST Segments: no acute change  No significant change from prior EKG dated 7-      MDM:  1. Physician evaluation performed at: On arrival  2. Stroke alert called at: On arrival  3. CT results obtained at:  9:50am  4. Decision was made that TPA is NOT indicated in consultation with [address] [place] [address] Stroke Neurologist, Dr. Jc Raza at 10:10 as patient states he did wake up this morning with some trouble walking making the last known well 12:30am .    t-PA NOT given due to the following EXCLUSION CRITERIA:  [x] Administration of t-PA can not be initiated within 4.5 hours of onset of symptoms  [] Evidence of intracranial hemorrhage on pretreatment CT  [] Clinical presentation suggestive of subarachnoid hemorrhage (even with normal CT)  [] CT shows multilobar infarction (hypodensity of > 1/3 cerebral hemisphere)  [] Known intracranial neoplasm, arteriovenous malformation or aneurysm  [] Significant head trauma (with sustained loss of consciousness), intracranial, or  intraspinal surgery within past 3 months  [] *Blood pressure elevated (systolic > 850 mm Hg or diastolic > 861 mm Hg)   [] *Abnormal blood glucose (< 50 or > 400mg/dL)  [] Active internal bleeding  [] Known bleeding risk (including but not limited to below)   Heparin, argatroban, or bivalirudin received within 48 hours with     PTT > upper limit of normal   Platelet count < 641,172/PE4   Current or recent use of anticoagulants including but not limited to:     Warfarin (Coumadin®) within 5 days or INR > 1.7 Apixiban (Eliquis®) within 48 hours**     Dabigatran (Pradaxa®) within 72 hours**     Enoxaparin (Lovenox®) within 24 hours**     Fondaparinux (Arixtra®) within 72 hours**     Rivaroxaban (Xarelto®) within 24 hours**     **For patients with normal renal function. Activity may be significantly   prolonged in the elderly or patients with renal dysfunction    *Remains thrombolytic eligible if BP/BG corrected while in treatment window        MDM: Stroke alert called prior to arrival.  Did examine patient on arrival.  Patient sent straight down to CT. Currently giving patient an NIH of 4. His blood pressure is elevated at 221/111. Has not yet taken his morning meds. Did order 10 mg of labetalol IV. Afebrile. Heart rate in the 60s. Sats are normal.  Radiology did call and state his CT head does not show any acute findings at 9:50 AM.  Shows a white count of 7.5. Hemoglobin of 16.2. Electrolytes normal.  Troponin normal.  INR is normal.  Dr. Pierre Tate did see patient on stroke camera and states upon further prodding it does seem patient woke up with some difficulty with his walking which would make his last known well 12:30am last PM.  He is not a TPA candidate. Did ask us to load him with 325 of aspirin and admit him at our facility for MRI and further eval.  CTA head neck shows no large vessel occlusion. No acute abnormality or rate limiting stenosis of the major arteries of the head. He does have 30% stenosis at the origin of the left internal carotid. 40% stenosis of the right internal carotid. 50% stenosis of the right subclavian. .  Patient's blood pressure has improved down into the 150s. Did discuss patient will need to be admitted for the rest of his stroke evaluation. They do voiced understanding and agree with plan. I did discuss with them and they would like to stay at [facility] for further evaluation and treatment. Clinical Impression:  1. Slurred speech    2.  Right arm weakness 3. Facial droop      (Please note that portions of this note may have been completed with a voice recognition program. Efforts were made to edit the dictations but occasionally words are mis-transcribed.)    MD Kalyan Stewart MD  05/02/21 [id number]

## 2021-05-02 NOTE — ED NOTES
Report called to [facility] on 3E and will be down shortly to do bedside NIH stroke scale hand off.  No changes noted to previous patient assessment     Nelson Yi RN  05/02/21 [id number]

## 2021-05-02 NOTE — ED NOTES
Dr Vicki Ramirez from [facility] on robot to evaluate patient     Lucius Nguyen, RN  05/02/21 [id number]

## 2021-05-02 NOTE — H&P
History and Physical      Name:  Jacob Damon /Age/Sex: 1955  (63 y.o. 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  male  who presents with Cerebrovascular Accident (stroke alert called by EMS), Extremity Weakness (right side), and Aphasia    Assessment and Plan:   Acute CVA   # Aphasia  # Right facial droop  Stroke alert initiated on the field. Patient not candidate for tPA. CTA head and neck:50% stenosis at the origin of the right subclavian artery. 40% stenosis at the origin of the right internal carotid artery. 30% stenosis at the origin of the left internal carotid artery. No acute abnormality or flow-limiting stenosis of the major arteries of the head. -Continue ASA and start high dose Lipitor  -Allow permissive HTN, SBP goal: 180-220  -Check Lipid panel and A1c  -MRI brain   -Echo  -Neuro check  -Neurology consult    HTN, uncontrolled  -Missed morning dose  -Hold BP medications to allow permissive HTN  -Labetolol IV PRN for /110  -Monitor BP    HLD  -On Lipitor 10 mg daily will increase dose to 80 mg    CAD s/p stents  -Continue ASA and statins  -Hold BB and ACE to allow permissive HTN    Obesity class 1 with BMI 32.2  -Educated about lifestyle modifications    Depression/Anxiety  -On Lexapro    Centrilobular emphysema  - Albuterol and Bevesp    RLS  -On Requip        DVT-PPX: Lovenox  Diet: General    Medications:   Medications:    Infusions:   PRN Meds:   No current facility-administered medications for this encounter.      Current Outpatient Medications:     escitalopram (LEXAPRO) 10 MG tablet, Take 10 mg by mouth daily, Disp: , Rfl:     rOPINIRole (REQUIP) 1 MG tablet, Take 1 mg by mouth nightly, Disp: , Rfl:     aspirin 81 MG EC tablet, Take 81 mg by mouth daily, Disp: , Rfl:     losartan (COZAAR) 25 MG tablet, Take 25 mg by mouth daily, Disp: , Rfl:     albuterol sulfate HFA (VENTOLIN HFA) 108 (90 Base) MCG/ACT inhaler, Inhale 2 puffs into the lungs every 6 hours as needed for Wheezing, Disp: , Rfl:     glycopyrrolate-formoterol (BEVESPI AEROSPHERE) 9-4.8 MCG/ACT AERO, Inhale 2 puffs into the lungs 2 times daily, Disp: 1 Inhaler, Rfl: 5    atorvastatin (LIPITOR) 10 MG tablet, Take 1 tablet by mouth nightly, Disp: 30 tablet, Rfl: 3    carvedilol (COREG) 12.5 MG tablet, Take 1 tablet by mouth 2 times daily (with meals), Disp: 60 tablet, Rfl: 3    lisinopril (PRINIVIL;ZESTRIL) 10 MG tablet, Take 10 mg by mouth daily, Disp: , Rfl:     nitroGLYCERIN (NITROSTAT) 0.4 MG SL tablet, up to max of 3 total doses. If no relief after 1 dose, call 911. (Patient not taking: Reported on 12/5/2019), Disp: 25 tablet, Rfl: 0    History of present illness     Chief Complaint: Cerebrovascular Accident (stroke alert called by EMS), Extremity Weakness (right side), and Aphasia      Jameson Garza is a 63 y.o.  male  who presents with slurred speech, facial droop, and right side weakness that started this morning around 8:30 AM.  Patient woke up this morning in his usual state of health and walked her dog and briefly around 830 he started having difficulty using his right arm and dropped his coffee. Patient stated his speech was slurred and he had difficulty walking with his right leg. Patient states she was recently taken off Lisinopril and started on Coreg. Denies visual changes, tingling or numbness, chest pain, shortness of breath, fever, chills, headache, and dizziness. Hx of HTN, HLD, CAD, Depression, anxiety, RLS, obesity, and emphysema. Review of Systems   Ten point ROS reviewed and negative, unless as noted below. GENERAL:  Denies fever, chills, night sweats, or changes in weight. EYES:  Denies recent visual changes. ENT:  Denies ear pain, hearing loss or tinnitus  RESP:  Denies any cough, dyspnea, or wheezing.  Other disorders of kidney and ureter     kidney stone    Psychiatric problem      PSHX:  has a past surgical history that includes hernia repair; Coronary artery bypass graft; Cardiac surgery; Cardiac catheterization; and Coronary angioplasty with stent. Allergies: No Known Allergies    FAM HX: family history includes Cancer in his father; Early Death in his brother; Heart Disease in his brother; High Blood Pressure in his brother and mother; Stroke in his mother. Family history reviewed and is essentially negative unless as stated above.      Soc HX:   Social History     Socioeconomic History    Marital status:      Spouse name: None    Number of children: None    Years of education: None    Highest education level: None   Occupational History    None   Social Needs    Financial resource strain: None    Food insecurity     Worry: None     Inability: None    Transportation needs     Medical: None     Non-medical: None   Tobacco Use    Smoking status: Former Smoker     Packs/day: 1.50     Years: 30.00     Pack years: 45.00     Types: Cigarettes     Quit date: 2016     Years since quittin.7    Smokeless tobacco: Never Used   Substance and Sexual Activity    Alcohol use: No     Comment: 2015     Drug use: No    Sexual activity: Yes     Partners: Female   Lifestyle    Physical activity     Days per week: None     Minutes per session: None    Stress: None   Relationships    Social connections     Talks on phone: None     Gets together: None     Attends Congregation service: None     Active member of club or organization: None     Attends meetings of clubs or organizations: None     Relationship status: None    Intimate partner violence     Fear of current or ex partner: None     Emotionally abused: None     Physically abused: None     Forced sexual activity: None   Other Topics Concern    None   Social History Narrative    None       Electronically signed by Ryan Shaw APRN - CNP on 5/2/2021 at 10:42 AM

## 2021-05-02 NOTE — CODE DOCUMENTATION
Pt directly to CT scan on arrival on EMS cot. EMS reports pt was at home and awake and dropped his cup of coffee. Pt then noticed the right sided weakness, slurred speech, and right sided facial droop. LKW 0845.  Blood sugar 210 per EMS on arrival

## 2021-05-02 NOTE — ED NOTES
Bed: 04TR-04  Expected date:   Expected time:   Means of arrival:   Comments:  EMS- Stroke alert, right sided weakness, slurred speech 62's M     Amparo Hannon RN  05/02/21 0003

## 2021-05-03 ENCOUNTER — APPOINTMENT (OUTPATIENT)
Dept: MRI IMAGING | Age: 66
DRG: 065 | End: 2021-05-03
Payer: COMMERCIAL

## 2021-05-03 LAB
CHOLESTEROL: 186 MG/DL
ESTIMATED AVERAGE GLUCOSE: 180 MG/DL
FOLATE: 17.7 NG/ML (ref 3.1–17.5)
GLUCOSE BLD-MCNC: 157 MG/DL (ref 70–99)
GLUCOSE BLD-MCNC: 169 MG/DL (ref 70–99)
GLUCOSE BLD-MCNC: 170 MG/DL (ref 70–99)
GLUCOSE BLD-MCNC: 207 MG/DL (ref 70–99)
HBA1C MFR BLD: 7.9 % (ref 4.2–6.3)
HCT VFR BLD CALC: 45.6 % (ref 42–52)
HDLC SERPL-MCNC: 44 MG/DL
HEMOGLOBIN: 15.2 GM/DL (ref 13.5–18)
HOMOCYSTEINE: 9 UMOL/L (ref 0–10)
LDL CHOLESTEROL DIRECT: 129 MG/DL
MCH RBC QN AUTO: 31.2 PG (ref 27–31)
MCHC RBC AUTO-ENTMCNC: 33.3 % (ref 32–36)
MCV RBC AUTO: 93.6 FL (ref 78–100)
PDW BLD-RTO: 12.1 % (ref 11.7–14.9)
PLATELET # BLD: 217 K/CU MM (ref 140–440)
PMV BLD AUTO: 9.9 FL (ref 7.5–11.1)
RBC # BLD: 4.87 M/CU MM (ref 4.6–6.2)
TRIGL SERPL-MCNC: 187 MG/DL
TSH HIGH SENSITIVITY: 1.14 UIU/ML (ref 0.27–4.2)
VITAMIN B-12: 435.1 PG/ML (ref 211–911)
WBC # BLD: 8.4 K/CU MM (ref 4–10.5)

## 2021-05-03 PROCEDURE — 80061 LIPID PANEL: CPT

## 2021-05-03 PROCEDURE — 92610 EVALUATE SWALLOWING FUNCTION: CPT

## 2021-05-03 PROCEDURE — 83721 ASSAY OF BLOOD LIPOPROTEIN: CPT

## 2021-05-03 PROCEDURE — 6360000002 HC RX W HCPCS: Performed by: NURSE PRACTITIONER

## 2021-05-03 PROCEDURE — 83036 HEMOGLOBIN GLYCOSYLATED A1C: CPT

## 2021-05-03 PROCEDURE — 6370000000 HC RX 637 (ALT 250 FOR IP): Performed by: NURSE PRACTITIONER

## 2021-05-03 PROCEDURE — 97116 GAIT TRAINING THERAPY: CPT

## 2021-05-03 PROCEDURE — 70551 MRI BRAIN STEM W/O DYE: CPT

## 2021-05-03 PROCEDURE — 82962 GLUCOSE BLOOD TEST: CPT

## 2021-05-03 PROCEDURE — B24BZZ4 ULTRASONOGRAPHY OF HEART WITH AORTA, TRANSESOPHAGEAL: ICD-10-PCS | Performed by: INTERNAL MEDICINE

## 2021-05-03 PROCEDURE — 1200000000 HC SEMI PRIVATE

## 2021-05-03 PROCEDURE — 36415 COLL VENOUS BLD VENIPUNCTURE: CPT

## 2021-05-03 PROCEDURE — 97162 PT EVAL MOD COMPLEX 30 MIN: CPT

## 2021-05-03 PROCEDURE — 82607 VITAMIN B-12: CPT

## 2021-05-03 PROCEDURE — 94761 N-INVAS EAR/PLS OXIMETRY MLT: CPT

## 2021-05-03 PROCEDURE — 6370000000 HC RX 637 (ALT 250 FOR IP): Performed by: INTERNAL MEDICINE

## 2021-05-03 PROCEDURE — 85027 COMPLETE CBC AUTOMATED: CPT

## 2021-05-03 PROCEDURE — 93306 TTE W/DOPPLER COMPLETE: CPT

## 2021-05-03 PROCEDURE — 83090 ASSAY OF HOMOCYSTEINE: CPT

## 2021-05-03 PROCEDURE — 2580000003 HC RX 258: Performed by: NURSE PRACTITIONER

## 2021-05-03 PROCEDURE — 97530 THERAPEUTIC ACTIVITIES: CPT

## 2021-05-03 PROCEDURE — 97166 OT EVAL MOD COMPLEX 45 MIN: CPT

## 2021-05-03 PROCEDURE — 84443 ASSAY THYROID STIM HORMONE: CPT

## 2021-05-03 PROCEDURE — 6370000000 HC RX 637 (ALT 250 FOR IP): Performed by: PSYCHIATRY & NEUROLOGY

## 2021-05-03 PROCEDURE — 82746 ASSAY OF FOLIC ACID SERUM: CPT

## 2021-05-03 PROCEDURE — 94640 AIRWAY INHALATION TREATMENT: CPT

## 2021-05-03 PROCEDURE — 2700000000 HC OXYGEN THERAPY PER DAY

## 2021-05-03 RX ORDER — PRAMIPEXOLE DIHYDROCHLORIDE 0.25 MG/1
0.5 TABLET ORAL NIGHTLY
Status: DISCONTINUED | OUTPATIENT
Start: 2021-05-03 | End: 2021-05-04 | Stop reason: HOSPADM

## 2021-05-03 RX ORDER — DEXTROSE MONOHYDRATE 50 MG/ML
100 INJECTION, SOLUTION INTRAVENOUS PRN
Status: DISCONTINUED | OUTPATIENT
Start: 2021-05-03 | End: 2021-05-04 | Stop reason: HOSPADM

## 2021-05-03 RX ORDER — LOSARTAN POTASSIUM 25 MG/1
25 TABLET ORAL DAILY
Status: DISCONTINUED | OUTPATIENT
Start: 2021-05-03 | End: 2021-05-04 | Stop reason: HOSPADM

## 2021-05-03 RX ORDER — DEXTROSE MONOHYDRATE 25 G/50ML
12.5 INJECTION, SOLUTION INTRAVENOUS PRN
Status: DISCONTINUED | OUTPATIENT
Start: 2021-05-03 | End: 2021-05-04 | Stop reason: HOSPADM

## 2021-05-03 RX ORDER — CARVEDILOL 12.5 MG/1
12.5 TABLET ORAL 2 TIMES DAILY WITH MEALS
Status: DISCONTINUED | OUTPATIENT
Start: 2021-05-03 | End: 2021-05-04 | Stop reason: HOSPADM

## 2021-05-03 RX ORDER — NICOTINE POLACRILEX 4 MG
15 LOZENGE BUCCAL PRN
Status: DISCONTINUED | OUTPATIENT
Start: 2021-05-03 | End: 2021-05-04 | Stop reason: HOSPADM

## 2021-05-03 RX ADMIN — LOSARTAN POTASSIUM 25 MG: 25 TABLET, FILM COATED ORAL at 21:10

## 2021-05-03 RX ADMIN — ATORVASTATIN CALCIUM 80 MG: 80 TABLET, FILM COATED ORAL at 21:11

## 2021-05-03 RX ADMIN — GLYCOPYRROLATE AND FORMOTEROL FUMARATE 2 PUFF: 9; 4.8 AEROSOL, METERED RESPIRATORY (INHALATION) at 07:37

## 2021-05-03 RX ADMIN — CARVEDILOL 12.5 MG: 12.5 TABLET, FILM COATED ORAL at 21:10

## 2021-05-03 RX ADMIN — PRAMIPEXOLE DIHYDROCHLORIDE 0.5 MG: 0.25 TABLET ORAL at 16:42

## 2021-05-03 RX ADMIN — SODIUM CHLORIDE, PRESERVATIVE FREE 10 ML: 5 INJECTION INTRAVENOUS at 10:26

## 2021-05-03 RX ADMIN — ASPIRIN 81 MG: 81 TABLET, COATED ORAL at 10:25

## 2021-05-03 RX ADMIN — SODIUM CHLORIDE, PRESERVATIVE FREE 10 ML: 5 INJECTION INTRAVENOUS at 21:11

## 2021-05-03 RX ADMIN — ENOXAPARIN SODIUM 40 MG: 40 INJECTION SUBCUTANEOUS at 10:25

## 2021-05-03 RX ADMIN — ESCITALOPRAM OXALATE 10 MG: 10 TABLET ORAL at 21:11

## 2021-05-03 RX ADMIN — CLOPIDOGREL BISULFATE 75 MG: 75 TABLET ORAL at 10:25

## 2021-05-03 NOTE — PROGRESS NOTES
NEUROLOGY NOTE  DR. Arianne Johnson MD.  -------------------------------------------------  Subjective:    Pt states he has RLS of legs right worse than left    Today pt has restless rightleg and arm    Doing better. Denies any new symptoms. Denies headache nausea vomiting dizziness    Denies numbness weakness extremities    Denies blurring of vision double vision    Objective:    BP (!) 183/96   Pulse 92   Temp 98.1 °F (36.7 °C) (Oral)   Resp 17   Ht 5' 10\" (1.778 m)   Wt 225 lb (102.1 kg)   SpO2 93%   BMI 32.28 kg/m²   HEENT nl      Neuro exam    Alert Oriented  X 3  Follow simple commands  EOMI Pupils 3 mm maikol  5/5 all 4 extremities      RADIOLOGY  -----------------    Echocardiogram Complete 2d With Doppler With Color    Result Date: 5/3/2021  Transthoracic Echocardiography Report (TTE)  Demographics   Patient Name       SNEHA VAZQUEZ        Date of Study       05/03/2021   Date of Birth      1955         Gender              Male   Age                63 year(s)         Race                   Patient Number     [id number]         Room Number         0792   Visit Number       [id number]   Corporate ID       [id number]   Accession Number   [id number]         Sonographer         Royal Madalyn [facility]   Ordering Physician Kim Cross MD                     CNP                Physician  Procedure Type of Study   TTE procedure:ECHOCARDIOGRAM COMPLETE 2D W DOPPLER W COLOR. Procedure Date Date: 05/03/2021 Start: 10:19 AM Study Location: Portable Technical Quality: Adequate visualization Indications:TIA. Patient Status: Routine Contrast Medium: Bubble Study. Height: 70 inches Weight: 225 pounds BSA: 2.19 m2 BMI: 32.28 kg/m2 HR: 55 bpm BP: 152/110 mmHg  Conclusions   Summary  Left ventricular systolic function is normal.  Ejection fraction is visually estimated at 50-55%.   Flattening of the interventricular septum due to right ventricular  volume/pressure overload. Moderately enlarged right ventricle cavity. No evidence of any pericardial effusion. Negative bubble study; no evidence of PFO or ASD. Signature   ------------------------------------------------------------------  Electronically signed by Jones Morgan MD (Interpreting  physician) on 05/03/2021 at 11:34 AM  ------------------------------------------------------------------   Findings   Left Ventricle  Left ventricular systolic function is normal.  Ejection fraction is visually estimated at 50-55%. Indeterminate diastolic function; E/A flow reversal is noted. Flattening of the interventricular septum due to right ventricular  volume/pressure overload. Left Atrium  Essentially normal left atrium. Right Atrium  Essentially normal right atrium. Right Ventricle  Moderately enlarged right ventricle cavity. Aortic Valve  Structurally normal aortic valve. Mitral Valve  Mild mitral regurgitation. Tricuspid Valve  Mild tricuspid regurgitation; RVSP: 35 mmHg. Pulmonic Valve  Mild pulmonic regurgitation present. Pericardial Effusion  No evidence of any pericardial effusion. Pleural Effusion  No evidence of pleural effusion. Miscellaneous  Negative bubble study; no evidence of PFO or ASD.   M-Mode/2D Measurements & Calculations   LV Diastolic Dimension:  LV Systolic Dimension:  LA Dimension: 4 cmAO Root  4.11 cm                  2.48 cm                 Dimension: 3.2 cmLA Area:  LV FS:39.7 %             LV Volume Diastolic: 84 01.4 cm2  LV PW Diastolic: 4.08 cm ml  LV PW Systolic: 1.5 cm   LV Volume Systolic: 32  Septum Diastolic: 4.18   ml  cm                       LV EDV/LV EDV Index: 84 RV Diastolic Dimension:  Septum Systolic: 4.02 cm AE/64 H9VF ESV/LV ESV   3.83 cm  CO: 4.49 l/min           Index: 32 ml/15 m2  CI: 2.05 l/m*m2          EF Calculated (A4C):    LA/Aorta: 1.25                           61.9 %  LV Area Diastolic: 92.5  EF Calculated (2D):     LA volume/Index: 37 ml  cm2                      70.7 %                  /36N2  LV Area Systolic: 49.9  cm2                      LV Length: 8.47 cm                            LVOT: 2.1 cm  Doppler Measurements & Calculations   MV Peak E-Wave: 56.8 AV Peak Velocity: 117 cm/s    LVOT Peak Velocity: 103  cm/s                 AV Peak Gradient: 5.48 mmHg   cm/s  MV Peak A-Wave: 74.5 AV Mean Velocity: 91.5 cm/s   LVOT Mean Velocity: 71.7  cm/s                 AV Mean Gradient: 4 mmHg      cm/s  MV E/A Ratio: 0.76   AV VTI: 26 cm                 LVOT Peak Gradient: 4  MV Peak Gradient:    AV Area (Continuity):3.14 cm2 mmHgLVOT Mean Gradient: 2  1.29 mmHg                                          mmHg                       LVOT VTI: 23.6 cm             Estimated RVSP: 35 mmHg  MV P1/2t: 51 msec                                  Estimated RAP:3 mmHg  MVA by PHT:4.31 cm2  Estimated PASP: 25.85 mmHg   MV E' Septal                                       TR Velocity:239 cm/s  Velocity: 5.04 cm/s                                TR Gradient:22.85 mmHg  MV E' Lateral  Velocity: 8.01 cm/s  MV E/E' septal:  11.27  MV E/E' lateral:  7.09      Ct Head Wo Contrast    Result Date: 5/2/2021  EXAMINATION: CT OF THE HEAD WITHOUT CONTRAST  5/2/2021 9:41 am TECHNIQUE: CT of the head was performed without the administration of intravenous contrast. Dose modulation, iterative reconstruction, and/or weight based adjustment of the mA/kV was utilized to reduce the radiation dose to as low as reasonably achievable. COMPARISON: 08/03/2015 HISTORY: Acute right-sided weakness and slurred speech. FINDINGS: Image quality mildly degraded by motion artifact. BRAIN/VENTRICLES: No acute intracranial hemorrhage, mass effect or midline shift. No abnormal extra-axial fluid collection. The gray-white differentiation is maintained without evidence of an acute infarct. No hydrocephalus.  ORBITS: The visualized portion of the orbits demonstrate no acute abnormality. SINUSES: The visualized paranasal sinuses and mastoid air cells demonstrate no acute abnormality. SOFT TISSUES/SKULL:  No acute abnormality of the visualized skull or soft tissues. No acute intracranial abnormality. Findings were given to Dr. Talon Sepulveda in the ED on 5/2/2021 at 9:49 a.m. Xr Chest Portable    Result Date: 5/2/2021  EXAMINATION: ONE XRAY VIEW OF THE CHEST 5/2/2021 10:16 am COMPARISON: 07/13/2020 HISTORY: ORDERING SYSTEM PROVIDED HISTORY: right sided weakness, TECHNOLOGIST PROVIDED HISTORY: Reason for exam:->right sided weakness, FINDINGS: Status post median sternotomy. The lungs are without acute focal process. Similar linear right basilar opacity. There is no effusion or pneumothorax. The cardiomediastinal silhouette is stable. The osseous structures are stable. Unchanged elevated right hemidiaphragm. No acute process. Unchanged elevated right hemidiaphragm with subsegmental atelectasis or scarring at the right lung base     Cta Head Neck W Contrast    Result Date: 5/2/2021  EXAMINATION: CTA OF THE HEAD AND NECK WITH CONTRAST 5/2/2021 9:54 am: TECHNIQUE: CTA of the head and neck was performed with the administration of intravenous contrast. Multiplanar reformatted images are provided for review. MIP images are provided for review. Stenosis of the internal carotid arteries measured using NASCET criteria. Dose modulation, iterative reconstruction, and/or weight based adjustment of the mA/kV was utilized to reduce the radiation dose to as low as reasonably achievable.  COMPARISON: Noncontrast CT head from earlier today HISTORY: ORDERING SYSTEM PROVIDED HISTORY: right sided weakness, slurred speech TECHNOLOGIST PROVIDED HISTORY: Reason for exam:->right sided weakness, slurred speech Decision Support Exception - unselect if not a suspected or confirmed emergency medical condition->Emergency Medical Condition (MA) Reason for Exam: right sided weakness, 42 - 52 %    MCV 93.6 78 - 100 FL    MCH 31.2 (H) 27 - 31 PG    MCHC 33.3 32.0 - 36.0 %    RDW 12.1 11.7 - 14.9 %    Platelets 081 625 - 508 K/CU MM    MPV 9.9 7.5 - 11.1 FL   Hemoglobin A1c    Collection Time: 05/03/21  2:06 AM   Result Value Ref Range    Hemoglobin A1C 7.9 (H) 4.2 - 6.3 %    eAG 180 mg/dL   Lipid panel - fasting    Collection Time: 05/03/21  2:06 AM   Result Value Ref Range    Triglycerides 187 (H) <150 MG/DL    Cholesterol 186 <200 MG/DL    HDL 44 >40 MG/DL    LDL Direct 129 (H) <100 MG/DL   Homocysteine, Serum    Collection Time: 05/03/21  2:06 AM   Result Value Ref Range    Homocysteine 9.0 0 - 10 umol/L   Vitamin B12 & Folate    Collection Time: 05/03/21  2:06 AM   Result Value Ref Range    Vitamin B-12 435.1 211 - 911 pg/ml    Folate 17.7 (H) 3.1 - 17.5 NG/ML   TSH without Reflex    Collection Time: 05/03/21  2:06 AM   Result Value Ref Range    TSH, High Sensitivity 1.140 0.270 - 4.20 uIu/ml   POCT Glucose    Collection Time: 05/03/21 10:35 AM   Result Value Ref Range    POC Glucose 169 (H) 70 - 99 MG/DL   POCT Glucose    Collection Time: 05/03/21 12:47 PM   Result Value Ref Range    POC Glucose 207 (H) 70 - 99 MG/DL   POCT Glucose    Collection Time: 05/03/21  4:44 PM   Result Value Ref Range    POC Glucose 157 (H) 70 - 99 MG/DL         Medical problems    Patient Active Problem List:     Hypertension     Debility     Unstable angina (HCC)     Centrilobular emphysema (HCC)     Acute CVA (cerebrovascular accident) ([facility] [address]      ASSESSMENT:  ---------------------    left basal ganglia acute infarct-left Parietal subacute infarct    TIA r/o cardio carotid embolic event     Hypertensive encephalopathy     Mild to moderate bilateral carotid stenosis      RLS     PLAN:     CT brain neg     CTA as above     Mri brain as above     Echo neg    Consult cardiology for WALTER     B 12 folate TSH Homocysteine level nl     Continue asa      Plavix    Mirapex     Discussed dx prognosis meds side effects and karla with pt and answered all questions.           Electronically signed by Elsie Benito MD on 5/3/2021 at 7:10 PM

## 2021-05-03 NOTE — PROGRESS NOTES
Julee Padilla MD.  Section of General Neurology - Adult  Consult Note        Reason for Consult:    Requesting Physician:  No referring provider defined for this encounter. Thank you for your kind referral.    CHIEF COMPLAINT:  Facial droop slurred speech         HISTORY OF PRESENT ILLNESS:              The patient is a 63 y.o. male with a history of male  who presents with slurred speech, facial droop, and right side weakness that started this morning around 8:30 AM.  Patient woke up this morning in his usual state of health and walked her dog and briefly around 830 he started having difficulty using his right arm and dropped his coffee. Patient stated his speech was slurred and he had difficulty walking with his right leg. Patient states she was recently taken off Lisinopril and started on Coreg. Denies visual changes, tingling or numbness, chest pain, shortness of breath, fever, chills, headache, and dizziness. Hx of HTN, HLD, CAD, Depression, anxiety, RLS, obesity, and emphysema. CT brain was neg. CTA head neck showed mild to moderate carotid right subclavian  stenosis. pt takes asa at home. pt had SBP over 200.                            Past Medical History:        Diagnosis Date    CAD (coronary atherosclerotic disease)     Centrilobular emphysema ([address] 10/3/2019    Hyperlipidemia     Hypertension     Other disorders of kidney and ureter     kidney stone    Psychiatric problem      Past Surgical History:        Procedure Laterality Date    CARDIAC CATHETERIZATION      CARDIAC SURGERY      CORONARY ANGIOPLASTY WITH STENT PLACEMENT      reports he has 1 cardiac stent    CORONARY ARTERY BYPASS GRAFT      2 vessel    HERNIA REPAIR       Current Medications:   Current Facility-Administered Medications: albuterol sulfate  (90 Base) MCG/ACT inhaler 2 puff, 2 puff, Inhalation, Q6H PRN  rOPINIRole (REQUIP) tablet 1 mg, 1 mg, Oral, Nightly  nitroGLYCERIN (NITROSTAT) SL tablet 0.4 mg, 0.4 mg, REVIEW OF SYSTEMS:  CONSTITUTIONAL:  negative  HEENT:  negative  RESPIRATORY:  negative  CARDIOVASCULAR:  negative  GASTROINTESTINAL:  negative  GENITOURINARY:  negative  MUSCULOSKELETAL:  negative  BEHAVIOR/PSYCH:  Negative    ROS neg    Family hx neg    PHYSICAL EXAM  ------------------------  Vitals:  BP (!) 172/101   Pulse 65   Temp 98.3 °F (36.8 °C) (Oral)   Resp 20   Ht 5' 10\" (1.778 m)   Wt 225 lb (102.1 kg)   SpO2 95%   BMI 32.28 kg/m²      General:  Awake, alert, oriented X 3. Well developed, well nourished, well groomed. No apparent distress. HEENT:  Normocephalic, atraumatic. Pupils equal, round, reactive to light. No scleral icterus. No conjunctival injection. Normal lips, teeth, and gums. No nasal discharge. Neck:  Supple  Heart:  RRR, no murmurs, gallops, rubs  Lungs:  CTA bilaterally, bilat symmetrical expansion, no wheeze, rales, or rhonchi  Abdomen: Bowel sounds present, soft, nontender, no masses, no organomegaly, no peritoneal signs  Extremities:  No clubbing, cyanosis, or edema  Skin:  Warm and dry, no open lesions or rash  Breast: deferred  Rectal: deferred  Genitalia:  deferred    NEUROLOGICAL EXAM  ---------------------------------    Mental Status Exam:             Alert and oriented times three,follows commands,speech and language intact    Cranial Muqlrd-NW-MWK Intact.         Cranial nerve II           Visual acuity:  normal                 Cranial nerve III           Pupils:  equal, round, reactive to light      Cranial nerves III, IV, VI           Extraocular Movements: intact      Cranial nerve V           Facial sensation:  intact      Cranial nerve VII           Facial strength: intact      Cranial nerve VIII           Hearing:  intact      Cranial nerve IX           Palate:  intact      Cranial nerve XI         Shoulder shrug:  intact      Cranial nerve XII          Tongue movement:  normal    Motor:    Drift:  absent  Motor exam is symmetrical 5 out of 5 all extremities bilaterally  Tone:  normal  Abnormal Movements:  Absent    DTRs-2+ biceps,triceps,brachioradialis,knee jerks and ankle jerks bilaterally symmetrical.  Toes-downgoing bilaterally            Sensory:normal sensation              CBC with Differential:    Lab Results   Component Value Date    WBC 7.5 05/02/2021    RBC 5.05 05/02/2021    HGB 16.2 05/02/2021    HCT 47.3 05/02/2021     05/02/2021    MCV 93.7 05/02/2021    MCH 32.1 05/02/2021    MCHC 34.2 05/02/2021    RDW 12.0 05/02/2021    SEGSPCT 61.1 05/02/2021    LYMPHOPCT 28.2 05/02/2021    MONOPCT 8.2 05/02/2021    EOSPCT 0.8 02/27/2012    BASOPCT 0.7 05/02/2021    MONOSABS 0.6 05/02/2021    LYMPHSABS 2.1 05/02/2021    EOSABS 0.1 05/02/2021    BASOSABS 0.1 05/02/2021    DIFFTYPE AUTOMATED DIFFERENTIAL 05/02/2021     CMP:    Lab Results   Component Value Date     05/02/2021    K 4.9 05/02/2021    CL 99 05/02/2021    CO2 31 05/02/2021    BUN 12 05/02/2021    CREATININE 1.0 05/02/2021    GFRAA >60 05/02/2021    LABGLOM >60 05/02/2021    GLUCOSE 210 05/02/2021    PROT 6.7 05/02/2021    PROT 7.3 02/27/2012    LABALBU 4.3 05/02/2021    CALCIUM 9.7 05/02/2021    BILITOT 0.7 05/02/2021    ALKPHOS 104 05/02/2021    AST 15 05/02/2021    ALT 21 05/02/2021     BMP:    Lab Results   Component Value Date     05/02/2021    K 4.9 05/02/2021    CL 99 05/02/2021    CO2 31 05/02/2021    BUN 12 05/02/2021    LABALBU 4.3 05/02/2021    CREATININE 1.0 05/02/2021    CALCIUM 9.7 05/02/2021    GFRAA >60 05/02/2021    LABGLOM >60 05/02/2021    GLUCOSE 210 05/02/2021     PT/INR:    Lab Results   Component Value Date    PROTIME 11.4 05/02/2021    INR 0.94 05/02/2021     PTT:    Lab Results   Component Value Date    APTT 29.2 06/01/2017   [APTT  U/A:    Lab Results   Component Value Date    COLORU YELLOW 04/09/2014    WBCUA 3 04/09/2014    RBCUA 4 04/09/2014    MUCUS RARE 04/09/2014    BACTERIA OCCASIONAL 04/09/2014    CLARITYU CLEAR 04/09/2014    SPECGRAV 1.026

## 2021-05-03 NOTE — PROGRESS NOTES
detect light touch bilat UE/LE though may be slightly worse on right compared to left. Slightly impaired attention to right side. Mobility/treatment:   · Rolling L/R:  NT   · Supine to sit:  NT  · Transfers:   · Sit to stand: CGA for safety from recliner  · Stand to sit: El for small amount of steadying to recliner. Lack of awareness to right UE with dec carry over of cues provided to use UEs to support self with sitting  · Step pivot: CGA for safety, no AD   · Sitting balance:  SBA at edge of recliner, static and dynamic while managing socks. Inc time and effort. Cues for incorporating use of RUE  · Standing balance:  CGA to El without UE support   · Gait: ~250ft with CGA to El having 2 small LOBs with dec reactive balance strategies noted. Noted more balance impairments when dual tasking with cognitive piece as well as with head turns. Fair and consistent pace. · Educated pt and fiance on POC, role of PT, discharge recommendations. [facility] 6 Clicks Inpatient Mobility:  AM-PAC Inpatient Mobility Raw Score : 18    Safety: patient left in chair with alarm, call light within reach, gait belt used. Assessment:  Pt is a 63year old male admitted with right sided weakness and facial droop. MRI of the brain positive for an acute infarct in left basal ganglia as well as acute to subacute infarct in left parietal lobe. Recommend ARU once medically stable. At baseline he is very indep with gross mobility at home and in community as well as with ADLs/ADLs. He is functioning below his typical baseline and would benefit from continued therapy to address his current deficits, dec potential fall risk, and restore function. Complexity: Moderate  Prognosis: Good, no significant barriers to participation at this time.    Plan Times per week: 3+/week, 1 week,   Discharge Recommendations: IP Rehab  Equipment:no needs at this time     Goals:  Short term goals  Time Frame for Short term goals: 1 week  Short term goal 1:

## 2021-05-03 NOTE — PROGRESS NOTES
Hospitalist Progress Note      Name:  Ronaldo Balderrama /Age/Sex: 1955  (63 y.o. male)   MRN & CSN:  [id number] & [id number] Admission Date/Time: 2021  9:35 AM   Location:  [address] [place] [address]- PCP: Anna Ambrosio MD         Hospital Day: 2    Assessment and Plan:   Ronaldo Balderrama is a 63 y.o.  male  who presents with right-sided weakness and aphasia    1) Acute ischemic CVA left basal ganglia  -CT head: No acute intracranial abnormality  -CTA head and neck: 50%, 40%, and 30%, stenosis at the origin of right subclavian artery, right internal carotid artery and left internal carotid artery respectively  -MRI brain: Acute infarction in the left basal ganglia. Few additional scattered tiny foci of acute to subacute infarctions in the left parietal lobe  -Follow TTE  -Continue DAPT and statin  -Neurology on board  -PT OT on board    2) Newly diagnosed DM type II  -Review of patient chart showed abnormal FBS and RBS  -A1c of 7.9  -Start insulin sliding scale  -Will discharge on Metformin p.o. after 48 hours from the day of IV contrast      Other chronic medical conditions, medication resumed unless contraindicated  -Essential hypertension  -Hyperlipidemia  -CAD status post stents  -Obesity  -Emphysema  -Restless leg syndrome    Diet DIET GENERAL;   DVT Prophylaxis [] Lovenox, []  Heparin, [] SCDs, [] Ambulation   GI Prophylaxis [] PPI,  [] H2 Blocker,  [] Carafate,  [] Diet/Tube Feeds   Code Status Full Code   Disposition TBD   MDM      History of Present Illness:     Patient was seen and examined  Has right upper extremity weakness with mild aphasia  Denied any new weakness or worsening symptoms  No acute events overnight      Ten point ROS reviewed negative, unless as noted above    Objective:        Intake/Output Summary (Last 24 hours) at 5/3/2021 1228  Last data filed at 5/3/2021 1027  Gross per 24 hour   Intake    Output 550 ml   Net -550 ml      Vitals:   Vitals:    21 1010   BP: (!) 152/110   Pulse: 67 Resp: 19   Temp: 97.5 °F (36.4 °C)   SpO2: 94%     Physical Exam:   GEN Awake male, sitting upright in bed in no apparent distress. Appears given age. EYES Pupils are equally round. No scleral erythema, discharge, or conjunctivitis. HENT Mucous membranes are moist. Oral pharynx without exudates, no evidence of thrush. NECK Supple, no apparent thyromegaly or masses. RESP Clear to auscultation, no wheezes, rales or rhonchi. Symmetric chest movement while on room air. CARDIO/VASC S1/S2 auscultated. Regular rate without appreciable murmurs, rubs, or gallops. No JVD or carotid bruits. Peripheral pulses equal bilaterally and palpable. No peripheral edema. GI Abdomen is soft without significant tenderness, masses, or guarding. Bowel sounds are normoactive. Rectal exam deferred.  No costovertebral angle tenderness. Gaviria catheter is not present. HEME/LYMPH No palpable cervical lymphadenopathy and no hepatosplenomegaly. No petechiae or ecchymoses. MSK No gross joint deformities. SKIN Normal coloration, warm, dry. NEURO RUE weakness and mild aphasia  PSYCH Awake, alert, oriented x 4. Affect appropriate.     Medications:   Medications:    rOPINIRole  1 mg Oral Nightly    sodium chloride flush  5-40 mL Intravenous 2 times per day    enoxaparin  40 mg Subcutaneous Daily    aspirin  81 mg Oral Daily    Or    aspirin  300 mg Rectal Daily    atorvastatin  80 mg Oral Nightly    glycopyrrolate-formoterol  2 puff Inhalation BID    escitalopram  10 mg Oral Nightly    clopidogrel  75 mg Oral Daily      Infusions:    sodium chloride      sodium chloride 75 mL/hr at 05/02/21 1801     PRN Meds: albuterol sulfate HFA, 2 puff, Q6H PRN  nitroGLYCERIN, 0.4 mg, Q5 Min PRN  sodium chloride flush, 5-40 mL, PRN  sodium chloride, 25 mL, PRN  promethazine, 12.5 mg, Q6H PRN    Or  ondansetron, 4 mg, Q6H PRN  polyethylene glycol, 17 g, Daily PRN  labetalol, 10 mg, Q10 Min PRN          Electronically signed by Hawa Stubbs Kaitlin Feng MD on 5/3/2021 at 12:28 PM

## 2021-05-03 NOTE — CONSULTS
[id number] [facility],[address] [place], 1955, 3018/3018-A, 5/3/2021    Discharge Recommendation: Inpatient Rehabilitation      History:  [place]:  The primary encounter diagnosis was Slurred speech. Diagnoses of Right arm weakness and Facial droop were also pertinent to this visit. Subjective:  Patient states: \"I have my motorcycle stress ball with me today. I am squeezing it to get my throttle hand stronger! \"   Pain: Pt denied pain this date (0/10). Communication with other providers: PT, RN, LSW  Restrictions: General Precautions, Fall Risk    Home Setup/Prior level of function:  Social/Functional History  Lives With: Significant other  Type of Home: House  Home Layout: One level, Laundry in basement  Home Access: Ramped entrance  Bathroom Shower/Tub: Tub/Shower unit  Bathroom Toilet: Standard  Home Equipment: Rolling walker  ADL Assistance: Independent  Homemaking Assistance: Independent  Ambulation Assistance: Independent  Transfer Assistance: Independent  Active : Yes  Type of occupation: [employer]    Examination:  · Observation: Seated upright in chair upon arrival  · Vision:  · R eye: decreased command following when testing R peripheral. Pt reports he has chronic concerns with his right eye (fluid on the eye and blurriness). · [facility]  · Vitals: Stable vitals throughout session    Body Systems and functions:  · ROM:   · LUE: shoulder flexion WFL full range  · RUE: shoulder flexion full range, however pt requires increased time to reach full range. · Strength:  · LUE shoulder flexion: 5/5  · RUE shoulder flexion 4/5  ·  strength of R decreased compared to L   · Sensation: WFL  · Tone: Normal  · Coordination:  · LUE WFL  · RUE: impaired finger to nose assessment and opposition of digits. Decreased accuracy and deliberate movements.    · Perception: WNL    Activities of Daily Living (ADLs):  · Feeding: Clarice pt demo decreased strength and coordination of RUE and would require assist for bilateral tasks (ie cutting). Pt is R dominant and has a tendency to utilize LUE when tasks become challenging. Consistent VC to utilize RUE. · Grooming: Clarice in standing pt would require assist with fine motor tasks  Involving RUE (ie opening containers, accurately putting paste onto toothbrush, increased time for hair combing and VC to utilize comb in [place]. · UB bathing: Clarice assist d/t decreased strength and coordination of RUE. Recommend pt complete in seated d/t decreased dynamic standing balance. · LB bathing: Clarice assist d/t decreased strength and coordination of RUE. Recommend pt complete in seated d/t decreased dynamic standing balance. · UB dressing: Clarice assist d/t decreased strength and coordination of RUE. Recommend pt complete in seated d/t decreased dynamic standing balance. · LB dressing: Clarice pt donned socks in seated this date with setup, increased time, and VC. Pt demo difficulty grasping sock with R hand and difficulty accurately positioning sock onto foot. Pt had a tendency to utilize LUE when tasks utilizing RUE became challenging. VC to continue involvement of RUE in tasks. Pt was receptive. Pt demo SOB donning socks and states he gets SOB when performing strenuous tasks. Pt encouraged to engage in deep breathing. ·   · Toileting: Clarice assist for toilet transfers and while balancing in standing to perform LB clothing manipulation and jennifer care. Cognitive and Psychosocial Functioning:  · Overall cognitive status: Oriented to time, date, person, place, city  · Affect: Dysarthria. Facial droop noted. Balance:   · Sitting: Independent (pt sat EOB demo good dynamic sitting balance). · Standing: CGA (pt stood without use of AD. Demo fair dynamic standing balance). Functional Mobility:   · Bed Mobility: NT this date. Pt was received upright in chair upon arrival.  · Transfers:   · CGA  (pt performed STS from chair with CGA. Decreased cognition, Decreased coordination, Decreased fine motor control, Decreased vision/visual deficit, Decreased safe awareness  Treatment Diagnosis: acute CVA  Prognosis: Good  Decision Making: Medium Complexity  REQUIRES OT FOLLOW UP: Yes  Discharge Recommendations: William Patiño    Pt is a 63year old M admitted for acute CVA. MRI was positive for acute CVA in left basal ganglia and additional scattered tiny foci of acute to subacute infarcts in the L parietal lobe. Pt reports that prior to admission he was IND in ADLs, IADLs, and functional mobility. Pt was very active (riding motorcycles, working at [employer], tugging rope with his dog, and taking his dog on frequent walks. This date, pt demo decreased strength and coordination of RUE impacting ADL status. Pt demo decreased arm swing while ambulating and decreased dynamic standing balance impacting his ability to engage in ADLs/IADLs and ambulate safely. Pt is currently functioning below baseline and would benefit from skilled OT services through ARU. Plan:  Plan  Times per week: 4+  Times per day: Daily      Goals:  1. Pt will complete all aspects of bed mobility for EOB/OOB ADLs SBA. 2. Pt will complete UB/LB bathing with SBA and AE as needed. 3. Pt will complete all aspects of LB dressing with SBA and AE as needed. 4. Pt will complete all functional transfers to and from bed, chair, toilet, shower chair with SBA. 5. Pt will ambulate HH distance to bathroom for toileting with SBA. 6. Pt will complete all aspects of toileting task with SBA. 7. Pt will complete oral hygiene/grooming routine in standing at sink with SBA demo good dynamic standing balance for approx 8 minutes. 8. Pt will complete ther ex/ther act with focus on UB strengthening. Time:   Time in: 1455  Time out: 1328  Timed treatment minutes: 23  Total time: 33      Electronically signed by:       URIAH Villafuerte/L, [place], .[id number]

## 2021-05-04 VITALS
WEIGHT: 214.73 LBS | SYSTOLIC BLOOD PRESSURE: 148 MMHG | DIASTOLIC BLOOD PRESSURE: 90 MMHG | TEMPERATURE: 98 F | RESPIRATION RATE: 14 BRPM | HEIGHT: 70 IN | OXYGEN SATURATION: 97 % | HEART RATE: 72 BPM | BODY MASS INDEX: 30.74 KG/M2

## 2021-05-04 LAB
GLUCOSE BLD-MCNC: 152 MG/DL (ref 70–99)
SARS-COV-2, NAAT: NOT DETECTED
SOURCE: NORMAL

## 2021-05-04 PROCEDURE — 6370000000 HC RX 637 (ALT 250 FOR IP): Performed by: INTERNAL MEDICINE

## 2021-05-04 PROCEDURE — 6370000000 HC RX 637 (ALT 250 FOR IP): Performed by: PSYCHIATRY & NEUROLOGY

## 2021-05-04 PROCEDURE — 93312 ECHO TRANSESOPHAGEAL: CPT

## 2021-05-04 PROCEDURE — 94761 N-INVAS EAR/PLS OXIMETRY MLT: CPT

## 2021-05-04 PROCEDURE — 92523 SPEECH SOUND LANG COMPREHEN: CPT

## 2021-05-04 PROCEDURE — 2580000003 HC RX 258: Performed by: INTERNAL MEDICINE

## 2021-05-04 PROCEDURE — 82962 GLUCOSE BLOOD TEST: CPT

## 2021-05-04 PROCEDURE — 87635 SARS-COV-2 COVID-19 AMP PRB: CPT

## 2021-05-04 PROCEDURE — 7100000001 HC PACU RECOVERY - ADDTL 15 MIN

## 2021-05-04 PROCEDURE — 2700000000 HC OXYGEN THERAPY PER DAY

## 2021-05-04 PROCEDURE — 6360000002 HC RX W HCPCS: Performed by: NURSE PRACTITIONER

## 2021-05-04 PROCEDURE — 7100000000 HC PACU RECOVERY - FIRST 15 MIN

## 2021-05-04 PROCEDURE — 6370000000 HC RX 637 (ALT 250 FOR IP): Performed by: NURSE PRACTITIONER

## 2021-05-04 RX ORDER — ASPIRIN 81 MG/1
81 TABLET ORAL DAILY
Qty: 30 TABLET | Refills: 3 | Status: SHIPPED | OUTPATIENT
Start: 2021-05-04

## 2021-05-04 RX ORDER — SODIUM CHLORIDE 9 MG/ML
INJECTION, SOLUTION INTRAVENOUS CONTINUOUS
Status: DISCONTINUED | OUTPATIENT
Start: 2021-05-04 | End: 2021-05-04 | Stop reason: HOSPADM

## 2021-05-04 RX ORDER — TOBRAMYCIN AND DEXAMETHASONE 3; 1 MG/ML; MG/ML
2 SUSPENSION/ DROPS OPHTHALMIC
Status: DISCONTINUED | OUTPATIENT
Start: 2021-05-04 | End: 2021-05-04 | Stop reason: HOSPADM

## 2021-05-04 RX ORDER — CLOPIDOGREL BISULFATE 75 MG/1
75 TABLET ORAL DAILY
Qty: 30 TABLET | Refills: 3 | Status: SHIPPED | OUTPATIENT
Start: 2021-05-04

## 2021-05-04 RX ORDER — LOSARTAN POTASSIUM 50 MG/1
50 TABLET ORAL DAILY
Qty: 30 TABLET | Refills: 3 | Status: SHIPPED | OUTPATIENT
Start: 2021-05-04

## 2021-05-04 RX ORDER — ATORVASTATIN CALCIUM 80 MG/1
80 TABLET, FILM COATED ORAL NIGHTLY
Qty: 30 TABLET | Refills: 3 | Status: SHIPPED | OUTPATIENT
Start: 2021-05-04 | End: 2022-05-25 | Stop reason: CLARIF

## 2021-05-04 RX ORDER — TOBRAMYCIN AND DEXAMETHASONE 3; 1 MG/ML; MG/ML
2 SUSPENSION/ DROPS OPHTHALMIC
Qty: 1 BOTTLE | Refills: 0 | Status: SHIPPED | OUTPATIENT
Start: 2021-05-04 | End: 2021-05-14

## 2021-05-04 RX ORDER — CLOPIDOGREL BISULFATE 75 MG/1
75 TABLET ORAL DAILY
Qty: 30 TABLET | Refills: 3 | Status: SHIPPED | OUTPATIENT
Start: 2021-05-04 | End: 2021-05-04

## 2021-05-04 RX ADMIN — CLOPIDOGREL BISULFATE 75 MG: 75 TABLET ORAL at 11:13

## 2021-05-04 RX ADMIN — LOSARTAN POTASSIUM 25 MG: 25 TABLET, FILM COATED ORAL at 11:13

## 2021-05-04 RX ADMIN — ENOXAPARIN SODIUM 40 MG: 40 INJECTION SUBCUTANEOUS at 11:13

## 2021-05-04 RX ADMIN — TOBRAMYCIN AND DEXAMETHASONE 2 DROP: 3; 1 SUSPENSION/ DROPS OPHTHALMIC at 11:14

## 2021-05-04 RX ADMIN — ASPIRIN 81 MG: 81 TABLET, COATED ORAL at 11:13

## 2021-05-04 RX ADMIN — CARVEDILOL 12.5 MG: 12.5 TABLET, FILM COATED ORAL at 11:13

## 2021-05-04 RX ADMIN — SODIUM CHLORIDE: 9 INJECTION, SOLUTION INTRAVENOUS at 08:08

## 2021-05-04 ASSESSMENT — PAIN SCALES - GENERAL
PAINLEVEL_OUTOF10: 0
PAINLEVEL_OUTOF10: 0

## 2021-05-04 NOTE — PLAN OF CARE
Problem: HEMODYNAMIC STATUS  Goal: Patient has stable vital signs and fluid balance  5/4/2021 0049 by Bret Everett RN  Outcome: Ongoing  5/3/2021 2022 by Kyrie Watson. Cal Jamison RN  Outcome: Ongoing     Problem: ACTIVITY INTOLERANCE/IMPAIRED MOBILITY  Goal: Mobility/activity is maintained at optimum level for patient  5/4/2021 0049 by Bret Everett RN  Outcome: Ongoing  5/3/2021 2022 by Kyrie Watson. Cal Jamison RN  Outcome: Ongoing     Problem: COMMUNICATION IMPAIRMENT  Goal: Ability to express needs and understand communication  5/4/2021 0049 by Bret Everett RN  Outcome: Ongoing  5/3/2021 2022 by Kyrie Watson. Cal Jamison RN  Outcome: Ongoing     Problem: Discharge Planning:  Goal: Discharged to appropriate level of care  Description: Discharged to appropriate level of care  5/4/2021 0049 by Bret Everett RN  Outcome: Ongoing  5/3/2021 2022 by Kyrie Watson. Cal Jamison RN  Outcome: Ongoing     Problem: Serum Glucose Level - Abnormal:  Goal: Ability to maintain appropriate glucose levels will improve  Description: Ability to maintain appropriate glucose levels will improve  5/4/2021 0049 by Bret Everett RN  Outcome: Ongoing  5/3/2021 2022 by Kyrie Watson.  Cal Jamison RN  Outcome: Ongoing     Problem: Falls - Risk of:  Goal: Will remain free from falls  Description: Will remain free from falls  Outcome: Ongoing  Goal: Absence of physical injury  Description: Absence of physical injury  Outcome: Ongoing

## 2021-05-04 NOTE — CONSULTS
[address] [place] [address], [address] [facility]                                  CONSULTATION    PATIENT NAME: EVELYN HOOVER                        :        1955  MED REC NO:   [id number]                          ROOM:       3018  ACCOUNT NO:   [de-identified]                           ADMIT DATE: 2021  PROVIDER:     HUMA Flores    CONSULT DATE:  2021    CHIEF COMPLAINT:  Acute CVA. HISTORY OF PRESENT ILLNESS:  This is a 62-year-old male who presented to  the ER on 2021 with slurred speech, facial droop and right-sided  weakness. The patient states that it started that morning around 08:30  a.m. and he came to the ER around 10:30. States that he woke up that  morning feeling well, went for a walk with his dog and then around 08:30  started having difficulty using his right arm and ended up dropping his  coffee. He had difficulty walking with his right leg and his speech  became slurred. Workup done in the ER showed that he had acute CVA. MRI showed acute CVA of the left basal ganglia. CTA of head and neck  was done that showed 50% stenosis at the origin of the right subclavian  artery, 40% at the right internal carotid artery, and 30% at the left  internal carotid artery, but no significant flow-limiting stenosis to  the major arteries of the head. We have been consulted to rule out  cardioembolic source as a cause for his CVA. PAST MEDICAL HISTORY:  CAD, status post bypass surgery, status post PCI;  emphysema, hyperlipidemia, hypertension, kidney stones, psychiatric  problems. PAST SURGICAL HISTORY:  Hernia repair, two-vessel coronary artery bypass  graft back in , angioplasty done in 2017. ALLERGIES:  No known drug allergies. FAMILY HISTORY:  Heart disease in his brother, high blood pressure in  his brother and mother, stroke in his mother. SOCIAL HISTORY:  The patient is a former smoker.   He smoked for 30  years, but he quit in 2016. He denies any alcohol use. HOME MEDICATIONS:  The patient was on Lexapro, Requip, aspirin 81 daily,  losartan 25 daily, albuterol, _____, Lipitor 10 at bedtime, Coreg 12.5  b.i.d., nitro as needed. REVIEW OF SYSTEMS:  A 10-point review of systems is reviewed and is  negative unless noted in the HPI. PHYSICAL EXAMINATION:  GENERAL:  Awake, alert male, sitting up in bed, in no acute distress. HEENT:  Head:  Atraumatic, normocephalic. Eyes:  No scleral erythema,  discharge, or conjunctivitis is noted. NECK:  Trachea is midline. No apparent masses. CARDIAC:  Regular rate and rhythm. No murmurs, rubs, or gallops  auscultated. LUNGS:  Clear to auscultation bilaterally. Good rise and fall of the  chest.  ABDOMEN:  Soft, nontender. MUSCULOSKELETAL:  No obvious joint deformities. Skin:  No erythema or  ecchymosis noted. NEURO:  Alert and oriented x4. Obvious weakness noted to the right arm. Facial droop still noted to the right side of his face. Speech is no  longer slurred. PSYCH:  Normal mood and affect. LABORATORY DATA:  COVID test is negative. Lipid panel done shows LDL is  elevated at 129, triglycerides elevated at 187, HDL stable at 44. Homocysteine is stable. Hemoglobin A1c is elevated at 7.9, newly  diagnosed diabetes. CBC shows white count is stable, hemoglobin is  stable at 15.2, platelets are stable at 217. Folate mildly elevated,  vitamin B12 was normal.  CBC shows electrolytes within normal limits. Creatinine stable at 1.0. His troponin is negative. RADIOLOGY:  MRI of brain was done that shows acute infarction in the  left basal ganglia with a few additional scattered tiny foci of acute to  subacute infarctions in the left parietal lobe, minimal chronic  microvascular disease.     Chest x-ray was done that showed no acute process, unchanged, elevated  right hemidiaphragm with subsegmental atelectasis or scarring in the  right lung base.  CTA head and neck showed 50% stenosis at the origin of  the right subclavian artery, 40% stenosis at the origin of the right  internal carotid artery, 30% stenosis at the origin of the left internal  carotid artery. No acute abnormality or flow-limiting stenosis in major  arteries of the head. Echo was done that shows EF 50% to 55% with  flattening of the interventricular septum due to right ventricular  volume with pressure overload, moderately enlarged right ventricular  cavity, no evidence of any pericardial effusion. No evidence of PFO or  ASD. EKG done in the ER showed normal sinus rhythm, not acute overall. Last stress test was done 07/24/2020 and it was negative for ischemia,  EF was preserved. Last left heart catheterization was done in 2017,  that showed left main patent. LAD mid 100% occluded. D1 had an 80%  stenosis. Left circ with mild disease. OM mild disease. OM2 80% which  was reduced to 0% with drug-eluting stent. RCA had a proximal 80% and a  mid 80% stenosis. The SVG to PDA was patent and the LIMA to LAD was  patent. LVEDP was 10. IMPRESSION:  A 57-year-old male with an acute CVA, history of CAD,  status post PCI and CABG. He presents with right-sided weakness. MRI  does show acute left basal ganglia infarction with acute scattered foci  as well, possible cardioembolic source. We have been consulted for WALTER  to rule out thrombus, intracardiac. We will plan for WALTER to be done  today. We will also need workup for arrhythmia as well, can do Holter  outpatient if needed. Possibly may need to be started on  anticoagulation. Further treatment dictated by hospital course.     Agree with above  Plan for WALTER  Today     HUMA Tubbs    D: 05/04/2021 7:46:26       T: 05/04/2021 7:51:30     [email or web address]  Job#: [id number]     Doc#: [id number]    CC:  Ashley Alaniz MD

## 2021-05-04 NOTE — PROGRESS NOTES
Speech Language Pathology  Facility/Department: [facility]  Initial Speech/Language/Cognitive Assessment    NAME: Fabian Murphy  : 1955   MRN: [id number]  ADMISSION DATE: 2021  ADMITTING DIAGNOSIS: has Hypertension; Debility; Unstable angina ([facility] [address] Centrilobular emphysema ([facility] [address] and Acute CVA (cerebrovascular accident) [facility]) on their problem list.  DATE ONSET: this admission    Date of Eval: 2021   Evaluating Therapist: GABY Amato    RECENT RESULTS  CT OF HEAD/MRI: 5/3/2021  Impression   Acute infarction in the left basal ganglia.       A few additional scattered tiny foci of acute to subacute infarctions in the   left parietal lobe. Primary Complaint: slurred speech, right-sided weakness/facial droop    IMPRESSIONS: Fabian Murphy was seen for speech/language evaluation. He was admitted with changes in speech and right facial droop, concerning for CVA. MRI results above. Medical hx includes CAD, HTN, HLD. Speech/language WNL prior to admission. Oral mechanism examination reveals improved but continued right-sided orofacial weakness; continues with reduced labial retraction on right, lingual deviation to the right on protrusion. Motor speech assessment conducted informally. Pt presents with mild-moderate hyperkinetic dysarthria, characterized by reduced breath support in connected speech, inconsistent harsh/strained vocal quality, hypernasality, reduced articulatory precision, reduced prosodic variation. At the word level, pt is >90% intelligible. In longer utterances (phrases, sentences, conversation), speech intelligibility decreases to approximately 70%. Pt benefits from cues to slow speaking rate and over-articulate. Also improves with attention to breath support and cues for inhalation at beginning of longer utterances. He is incompletely insightful into these deficits.     The MS Aphasia Screening Test was administered to evaluate Tramaine Funes's expressive and receptive language skills. He earned the following scores:    Expressive Index:     Receptive Index:   Naming: 10/10      Yes/No Accuracy: 20/20   Automatic Speech: 10/10    Object Recognition: 10/10   Repetition: 8/10     Following Instructions: 8/10   Writing: 10/10      Reading Instructions: 10/10   Verbal Fluency: 5/10     Receptive Subscale: 48/50   Expressive Subscale: 43/50      Total Score: 91/100    Results of the test indicate receptive and expressive language skills grossly WFL. Few errors noted in performance are attributed to dysarthria and distractions during testing. No evidence of language impairment identified during testing. Pt participates in conversation with fluent speech, no paraphasia. He produces logical responses and follows directions accurately. Recommend brief course of outpatient therapy with SLP to address dysarthria strategies to improve functional communication. Results/recommendations reviewed with pt following assessment, and he indicated understanding. Pain:  Pain Assessment  Pain Assessment: 0-10  Pain Level: 0  Patient's Stated Pain Goal: No pain    Assessment:  Cognitive Diagnosis: WFL  Aphasia Diagnosis: Expressive/receptive language WFL  Speech Diagnosis: Mild-moderate hyperkinetic dysarthria        Recommendations:  Requires SLP Intervention: Yes  Duration/Frequency of Treatment: 1-2x/week for LOS  D/C Recommendations: Outpatient;Home ST; To be determined       Plan:   Goals:  Short-term Goals  Timeframe for Short-term Goals: length of admission  Goal 1: Pt will produce phrases and sentences with >90% intelligibility given min cues. Goal 2: Pt will participate in conversation with >80% intelligiblity given min cues. Goal 3: Pt/caregivers will indicate understanding of all recommendations.    Patient/family involved in developing goals and treatment plan: yes    Subjective:   Previous level of function and limitations: independent  General  Chart Reviewed: Yes  Patient assessed for rehabilitation services?: Yes  Social/Functional History  Lives With: Significant other  Type of Home: House  Home Layout: One level; Laundry in basement  Home Access: Ramped entrance  Bathroom Shower/Tub: Tub/Shower unit  Bathroom Toilet: Standard  Home Equipment: Rolling walker  ADL Assistance: Independent  Homemaking Assistance: Independent  Ambulation Assistance: Independent  Transfer Assistance: Independent  Active : Yes  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 Vision  Vision: Within Functional Limits  Hearing  Hearing: Within functional limits           Objective:     Oral/Motor  Oral Motor: Exceptions to [facility]    Auditory Comprehension  Comprehension: Within Functional Limits         Expression  Primary Mode of Expression: Verbal    Verbal Expression  Verbal Expression: Within functional limits    Written Expression  Dominant Hand: Right(unable to use d/t stroke deficits)    Motor Speech  Motor Speech: Exceptions to [facility]  Intelligibility: Moderate(Mild-moderate intelligibility deficits, most pronounced in phrases/sentences and conversation)  Apraxia: (No evidence of apraxia of speech)  Dysarthria : (mild-moderate dysarthria)    Pragmatics/Social Functioning  Pragmatics: Within functional limits    Cognition:      Orientation  Overall Orientation Status: Within Normal Limits    Additional Assessments:  N/A          Prognosis:  Speech Therapy Prognosis  Prognosis: Good  Individuals consulted  Consulted and agree with results and recommendations: Patient    Education:  Patient Education: recommendations, plan  Patient Education Response: Verbalizes understanding  Safety Devices in place: Yes  Type of devices:  All fall risk precautions in place    Therapy Time:   Individual Concurrent Group Co-treatment   Time In 1000         Time Out 1030         Minutes [address] CCC-SLP  5/4/2021 11:57 AM

## 2021-05-04 NOTE — DISCHARGE SUMMARY
Discharge Summary    Name:  Fabian Murphy /Age/Sex: 1955  (63 y.o. male)   MRN & CSN:  [id number] & [id number] Admission Date/Time: 2021  9:35 AM   Attending:  Poli Sellers MD Discharging Physician: Tim Oh MD     Hospital Course:   Fabian Murphy is a 63 y.o.  male  who presents with right-sided weakness and aphasia    Patient was seen and examined  No worsening of physical or right upper weakness  No chest pain or palpitations  No acute events overnight     Assessment and plan  1) Acute ischemic CVA left basal ganglia  -CT head: No acute intracranial abnormality  -CTA head and neck: 50%, 40%, and 30%, stenosis at the origin of right subclavian artery, right internal carotid artery and left internal carotid artery respectively  -MRI brain: Acute infarction in the left basal ganglia. Few additional scattered tiny foci of acute to subacute infarctions in the left parietal lobe  -WALTER: Small PFO noted; no thrombus. No intervention recommended. Cardiology  -Continue DAPT and statin  -Neurology on board; outpatient follow-up  -PT OT on board; recommended inpatient rehab but patient declined  -He wants outpatient PT and OT     2) Newly diagnosed DM type II  -Review of patient chart showed abnormal FBS and RBS  -A1c of 7.9  -Continue Metformin p.o.  -Follow-up PCP as outpatient     Other chronic medical conditions, medication resumed unless contraindicated  -Essential hypertension  -Hyperlipidemia  -CAD status post stents  -Obesity  -Emphysema  -Restless leg syndrome         The patient expressed appropriate understanding of and agreement with the discharge recommendations, medications, and plan.      Consults this admission:  IP CONSULT TO PHARMACY  PHARMACY TO CHANGE BASE FLUIDS  IP CONSULT TO HOSPITALIST  IP CONSULT TO NEUROLOGY  IP CONSULT TO CARDIOLOGY    Discharge Instruction:   Follow up appointments: Neurology in 2 weeks  Primary care physician:  within 2 weeks    Diet:  diabetic no evidence of thrush. NECK Supple, no apparent thyromegaly or masses. RESP Clear to auscultation, no wheezes, rales or rhonchi. Symmetric chest movement while on room air. CARDIO/VASC S1/S2 auscultated. Regular rate without appreciable murmurs, rubs, or gallops. No JVD or carotid bruits. Peripheral pulses equal bilaterally and palpable. No peripheral edema. GI Abdomen is soft without significant tenderness, masses, or guarding. Bowel sounds are normoactive. Rectal exam deferred.  No costovertebral angle tenderness. Gaviria catheter is not present. HEME/LYMPH No palpable cervical lymphadenopathy and no hepatosplenomegaly. No petechiae or ecchymoses. MSK No gross joint deformities. SKIN Normal coloration, warm, dry. NEURO RUE weakness with mild aphasia  PSYCH Awake, alert, oriented x 4. Affect appropriate.     BMP/CBC  Recent Labs     05/02/21  0942 05/03/21  0206     --    K 4.9  --    CL 99  --    CO2 31  --    BUN 12  --    CREATININE 1.0  --    WBC 7.5 8.4   HCT 47.3 45.6    217       IMAGING:  As above    Discharge Time of 25 minutes    Electronically signed by Sophie Lopez MD on 5/4/2021 at 11:03 AM

## 2021-05-04 NOTE — PROGRESS NOTES
CLINICAL PHARMACY NOTE: MEDS TO [address] Select Patient?: No  Total # of Prescriptions Filled: 4   The following medications were delivered to the patient:  · Atorvastatin 80mg  · Metformin 850mg  · Losartan 50mg  · Tobramycin/dexamethasone 0.3/0.1mg  Ophthalmic  suspension  Total # of Interventions Completed: 1  Time Spent (min): 30    Additional Documentation:  Transferred his clopidogrel 75mg to [facility] on [address] due to an insurance restriction.

## 2021-05-04 NOTE — CONSULTS
Will dictate full note  Plan for WALTER tomorrow  Keep NPO after midnight  ekg shows sinus      Echo -Summary-5/21   Left ventricular systolic function is normal.   Ejection fraction is visually estimated at 50-55%. Flattening of the interventricular septum due to right ventricular   volume/pressure overload. Moderately enlarged right ventricle cavity. No evidence of any pericardial effusion. Negative bubble study; no evidence of PFO or ASD. Stress test 2017      Summary    No EKG changes suggestive of ischemia with Lexiscan infusion.    Gating shows EF is 50%    Normal perfusion uptake, no ischemia noted     Cath--6/2017  [id number]  LEFT MAIN PATENT  LAD % OCCLUDED  D1-80% STENOSIS  LCX-MILD DX  OM MILD DX  OM2-80% TO 0% SU XIENCE [id number] STENT  RCA PROXIMAL 80%, MID 80% STENOSIS  SVG TO PDA PATENT  LIMA TO LAD PATENT  LVEDP 10  NO COMPLICATIONS  ANGIOMAX/ASA AND PLAVIX      MRI of head   Impression:        Acute infarction in the left basal ganglia. A few additional scattered tiny foci of acute to subacute infarctions in the   left parietal lobe. Minimal chronic microvascular disease. CTA head   Impression:        50% stenosis at the origin of the right subclavian artery. 40% stenosis at the origin of the right internal carotid artery. 30% stenosis at the origin of the left internal carotid artery. No acute abnormality or flow-limiting stenosis of the major arteries of the   head. PAST MEDICAL HISTORY:  History of coronary artery disease status post  CABG done. He had a heart catheterization done back in 2017. Left main  was patent. LAD was 100% occluded. Mid diagonal had 80% stenosis. Circ had mild disease. OM had mild disease. OM-2 was stented. RCA had  80% stenosis, but SVG to PDA and LIMA to LAD was patent.     History of coronary artery disease status post CABG done.   Hypertension,  hyperlipidemia, depression, had suicidal ideations in the past.  Kidney  stones.     PAST SURGICAL HISTORY:  LIMA to LAD and SVG to RCA graft. Both were  patent.     SOCIAL HISTORY:  He does not smoke, does not drink. He works at  [employer]. 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

## 2021-05-04 NOTE — CONSULTS
Dictated-[id number]  Acute CVA  Planning for WALTER today to r/o cardioembolic source  Will need OP Zio applied as well- could consider loop if negative.   Thanks for consult  Agree with above

## 2021-05-05 ENCOUNTER — CARE COORDINATION (OUTPATIENT)
Dept: CASE MANAGEMENT | Age: 66
End: 2021-05-05

## 2021-05-05 NOTE — PROGRESS NOTES
the interventricular septum due to right ventricular  volume/pressure overload. Moderately enlarged right ventricle cavity. No evidence of any pericardial effusion. Negative bubble study; no evidence of PFO or ASD. Signature   ------------------------------------------------------------------  Electronically signed by Jody Cooper MD (Interpreting  physician) on 05/03/2021 at 11:34 AM  ------------------------------------------------------------------   Findings   Left Ventricle  Left ventricular systolic function is normal.  Ejection fraction is visually estimated at 50-55%. Indeterminate diastolic function; E/A flow reversal is noted. Flattening of the interventricular septum due to right ventricular  volume/pressure overload. Left Atrium  Essentially normal left atrium. Right Atrium  Essentially normal right atrium. Right Ventricle  Moderately enlarged right ventricle cavity. Aortic Valve  Structurally normal aortic valve. Mitral Valve  Mild mitral regurgitation. Tricuspid Valve  Mild tricuspid regurgitation; RVSP: 35 mmHg. Pulmonic Valve  Mild pulmonic regurgitation present. Pericardial Effusion  No evidence of any pericardial effusion. Pleural Effusion  No evidence of pleural effusion. Miscellaneous  Negative bubble study; no evidence of PFO or ASD.   M-Mode/2D Measurements & Calculations   LV Diastolic Dimension:  LV Systolic Dimension:  LA Dimension: 4 cmAO Root  4.11 cm                  2.48 cm                 Dimension: 3.2 cmLA Area:  LV FS:39.7 %             LV Volume Diastolic: 84 63.7 cm2  LV PW Diastolic: 9.81 cm ml  LV PW Systolic: 1.5 cm   LV Volume Systolic: 32  Septum Diastolic: 6.69   ml  cm                       LV EDV/LV EDV Index: 84 RV Diastolic Dimension:  Septum Systolic: 9.51 cm XB/66 G8DN ESV/LV ESV   3.83 cm  CO: 4.49 l/min           Index: 32 ml/15 m2  CI: 2.05 l/m*m2          EF Calculated (A4C):    LA/Aorta: 1.25                           61.9 %  LV Area slurred speech Acuity: Acute Type of Exam: Initial Additional signs and symptoms: 80ml Isovue 370 FINDINGS: CTA NECK: AORTIC ARCH/ARCH VESSELS: No dissection or arterial injury. There is 50% stenosis at the origin of the right subclavian artery. No significant stenosis of the brachiocephalic or remainder of the bilateral subclavian arteries. CAROTID ARTERIES: There is 40% stenosis at the origin of the right internal carotid artery. There is 30% stenosis at the origin of the left internal carotid artery. No acute abnormality or flow-limiting stenosis in the remainder of the bilateral internal or common carotid arteries by NASCET criteria. VERTEBRAL ARTERIES: No dissection, arterial injury, or significant stenosis. SOFT TISSUES: The patient is status post median sternotomy. There is ground-glass attenuation at the lung apices, may be related to underdistention versus pneumonitis. No cervical or superior mediastinal lymphadenopathy. The larynx and pharynx are unremarkable. No acute abnormality of the salivary and thyroid glands. BONES: No acute osseous abnormality. CTA HEAD: ANTERIOR CIRCULATION: No significant stenosis of the intracranial internal carotid, anterior cerebral, or middle cerebral arteries. No aneurysm. POSTERIOR CIRCULATION: No significant stenosis of the vertebral, basilar, or posterior cerebral arteries. No aneurysm. OTHER: No dural venous sinus thrombosis on this non-dedicated study. BRAIN: No mass effect or midline shift. No extra-axial fluid collection. The gray-white differentiation is maintained. 50% stenosis at the origin of the right subclavian artery. 40% stenosis at the origin of the right internal carotid artery. 30% stenosis at the origin of the left internal carotid artery. No acute abnormality or flow-limiting stenosis of the major arteries of the head.      Mri Brain Without Contrast    Result Date: 5/3/2021  EXAMINATION: MRI OF THE BRAIN WITHOUT CONTRAST  5/3/2021 9:43 am TECHNIQUE: Multiplanar multisequence MRI of the brain was performed without the administration of intravenous contrast. COMPARISON: CT head May 2, 2021 HISTORY: ORDERING SYSTEM PROVIDED HISTORY: CVA TECHNOLOGIST PROVIDED HISTORY: Reason for exam:->CVA Decision Support Exception - unselect if not a suspected or confirmed emergency medical condition->Emergency Medical Condition (MA) Reason for Exam: rt sided weakness and aphasia x 1 day FINDINGS: INTRACRANIAL STRUCTURES/VENTRICLES: There is acute infarction in the left basal ganglia. There are a few additional scattered tiny foci of acute to subacute infarctions in the left parietal lobe. The sulci, cisterns and ventricles are age-appropriate in size. There are a few scattered foci of T2/FLAIR hyperintensity in the periventricular and subcortical white matter, likely related to minimal chronic microvascular disease. No mass effect or midline shift. No acute intracranial hemorrhage. There is no hydrocephalus. The sellar/suprasellar regions appear unremarkable. The normal signal voids within the major intracranial vessels appear maintained. ORBITS: The visualized portion of the orbits demonstrate no acute abnormality. SINUSES: There is scattered mild mucosal thickening in the paranasal sinuses. There is mild right mastoid effusion. BONES/SOFT TISSUES: The bone marrow signal intensity appears normal. The soft tissues demonstrate no acute abnormality. Acute infarction in the left basal ganglia. A few additional scattered tiny foci of acute to subacute infarctions in the left parietal lobe. Minimal chronic microvascular disease. Results were reported to DAYANA Lopez at 10:23 a.m. on May 3, 2021.        LAB RESULTS  --------------------    Recent Results (from the past 24 hour(s))   COVID-19, Rapid    Collection Time: 05/04/21  6:27 AM    Specimen: Nasopharyngeal   Result Value Ref Range    Source UNKNOWN     SARS-CoV-2, NAAT NOT DETECTED NOT DETECTED   POCT

## 2021-05-05 NOTE — PROCEDURES
[address] [place] [address], [address] [place] [address]                                 ECHOCARDIOGRAM    PATIENT NAME: James Cintron                        :        1955  MED REC NO:   [id number]                          ROOM:       [id number]  ACCOUNT NO:   [de-identified]                           ADMIT DATE: 2021  PROVIDER:     Dre Barber MD    INDICATION:  Stroke. This is a 72-year-old male patient who was brought to non invasive lab. The  patient received 4 mg Versed and 37.5 mg Fentanyl. Mouth guard was placed. The left atrium is normal size and no clot or thrombus noted. Left  atrium mildly enlarged . Mild mitral regurgitation is noted. Mitral valve is  normal.  There is moderate left LVH noted. Septal hypertrophy noted. LV function preserved 50-55%. Atrial septum is thin. There is a small  PFO noted with bubble study being positive study, but color Doppler was  negative. Moderate tricuspid regurgitation noted. Ascending is  normal.  Moderate plaque is noted at the end of the descending aorta. IMPRESSION:  1. No clot or thrombus was noted. In left atrium/appnedage or LV cavity. 2.  Mild mitral regurgitation, moderate left LVH present and septal  hypertrophy present. 3.  Ascending aorta is normal.  4.  Small PFO was noted with the bubble study being positive. 5.  Moderate tricuspid regurgitation noted. Pulmonic valve, aortic  valve is normal.  6.  Moderate plaque is noted in the descending aorta. PLAN:  1. At this time is that we will check the patient's blood pressure in  both arms as there appears to be some discrepancy noted. 2.  The patient is already on anti-platelets, aspirin, and Plavix. I  would recommended keeping that. The patient tolerated the procedure  well. No complication noted.         Alphonso Mackenzie MD    D: 2021 9:12:26       T: 2021 9:17:29     [id number]  Job#: [id number]     Doc#: [id number]    CC:

## 2021-05-07 ENCOUNTER — CARE COORDINATION (OUTPATIENT)
Dept: CASE MANAGEMENT | Age: 66
End: 2021-05-07

## 2021-05-07 NOTE — CARE COORDINATION
[facility] Transitions Initial Follow Up Call    Call within 2 business days of discharge: Yes    Patient: Hardeep Menon Patient : 1955   MRN: [id number]  Reason for Admission: Ac Ischemic CVA left basal ganglia, New DM  Discharge Date: 21 RARS: Readmission Risk Score: 11    Last Discharge [facility]       Complaint Diagnosis Description Type Department Provider    21 Cerebrovascular Accident; Extremity Weakness; Aphasia Slurred speech . .. ED to Hosp-Admission (Discharged) (ADMITTED) [facility] Tommy Vaca MD; Jinny Hannon...      . COVID19 RISK MONITORING  COVID19 SCREEN: 21 Negative  PATIENT RISK FACTORS: DM, Emphysema  RARS: 11  [facility] PCP: No    2nd attempt to reach for discharge call unsuccessful; no answer, no voice mail options. No HIPAA form on file for approved EC. Episode closed, no further outreach scheduled.      Christopher Singer RN

## 2021-08-03 ENCOUNTER — HOSPITAL ENCOUNTER (OUTPATIENT)
Dept: GENERAL RADIOLOGY | Age: 66
Discharge: HOME | End: 2021-08-03
Payer: COMMERCIAL

## 2021-08-03 ENCOUNTER — HOSPITAL ENCOUNTER (OUTPATIENT)
Age: 66
Discharge: HOME | End: 2021-08-03
Payer: COMMERCIAL

## 2021-08-03 DIAGNOSIS — R06.02 SOB (SHORTNESS OF BREATH): ICD-10-CM

## 2021-08-03 PROCEDURE — 71046 X-RAY EXAM CHEST 2 VIEWS: CPT

## 2022-02-02 ENCOUNTER — HOSPITAL ENCOUNTER (OUTPATIENT)
Dept: GENERAL RADIOLOGY | Age: 67
Discharge: HOME | End: 2022-02-02
Payer: COMMERCIAL

## 2022-02-02 ENCOUNTER — HOSPITAL ENCOUNTER (OUTPATIENT)
Age: 67
Discharge: HOME | End: 2022-02-02
Payer: COMMERCIAL

## 2022-02-02 DIAGNOSIS — N20.0 URIC ACID NEPHROLITHIASIS: ICD-10-CM

## 2022-02-02 DIAGNOSIS — R11.0 NAUSEA: ICD-10-CM

## 2022-02-02 PROCEDURE — 74018 RADEX ABDOMEN 1 VIEW: CPT

## 2022-04-07 ENCOUNTER — HOSPITAL ENCOUNTER (OUTPATIENT)
Dept: GENERAL RADIOLOGY | Age: 67
Discharge: HOME | End: 2022-04-07
Payer: COMMERCIAL

## 2022-04-07 ENCOUNTER — HOSPITAL ENCOUNTER (OUTPATIENT)
Age: 67
Discharge: HOME | End: 2022-04-07
Payer: COMMERCIAL

## 2022-04-07 DIAGNOSIS — S86.912A STRAIN OF LEFT KNEE AND LEG, INITIAL ENCOUNTER: ICD-10-CM

## 2022-04-07 DIAGNOSIS — M17.9 OSTEOARTHRITIS OF KNEE, UNSPECIFIED: ICD-10-CM

## 2022-04-07 PROCEDURE — 73562 X-RAY EXAM OF KNEE 3: CPT

## 2022-04-19 ENCOUNTER — HOSPITAL ENCOUNTER (OUTPATIENT)
Dept: PULMONOLOGY | Age: 67
Discharge: HOME | End: 2022-04-19
Payer: COMMERCIAL

## 2022-04-19 ENCOUNTER — HOSPITAL ENCOUNTER (OUTPATIENT)
Dept: CT IMAGING | Age: 67
Discharge: HOME | End: 2022-04-19
Payer: COMMERCIAL

## 2022-04-19 DIAGNOSIS — J44.9 OBSTRUCTIVE CHRONIC BRONCHITIS WITHOUT EXACERBATION (HCC): ICD-10-CM

## 2022-04-19 DIAGNOSIS — J98.11 DISCOID ATELECTASIS: ICD-10-CM

## 2022-04-19 DIAGNOSIS — R06.02 SHORTNESS OF BREATH: ICD-10-CM

## 2022-04-19 LAB
DLCO %PRED: 53 %
DLCO PRED: NORMAL
DLCO/VA %PRED: NORMAL
DLCO/VA PRED: NORMAL
DLCO/VA: NORMAL
DLCO: NORMAL
EXPIRATORY TIME-POST: NORMAL
EXPIRATORY TIME: NORMAL
FEF 25-75% %CHNG: NORMAL
FEF 25-75% %PRED-POST: NORMAL
FEF 25-75% %PRED-PRE: NORMAL
FEF 25-75% PRED: NORMAL
FEF 25-75%-POST: NORMAL
FEF 25-75%-PRE: NORMAL
FEV1 %PRED-POST: 47 %
FEV1 %PRED-PRE: 47 %
FEV1 PRED: NORMAL
FEV1-POST: NORMAL
FEV1-PRE: NORMAL
FEV1/FVC %PRED-POST: NORMAL
FEV1/FVC %PRED-PRE: NORMAL
FEV1/FVC PRED: NORMAL
FEV1/FVC-POST: 101 %
FEV1/FVC-PRE: 101 %
FVC %PRED-POST: NORMAL
FVC %PRED-PRE: NORMAL
FVC PRED: NORMAL
FVC-POST: NORMAL
FVC-PRE: NORMAL
GAW %PRED: NORMAL
GAW PRED: NORMAL
GAW: NORMAL
IC %PRED: NORMAL
IC PRED: NORMAL
IC: NORMAL
MEP: NORMAL
MIP: NORMAL
MVV %PRED-PRE: NORMAL
MVV PRED: NORMAL
MVV-PRE: NORMAL
PEF %PRED-POST: NORMAL
PEF %PRED-PRE: NORMAL
PEF PRED: NORMAL
PEF%CHNG: NORMAL
PEF-POST: NORMAL
PEF-PRE: NORMAL
RAW %PRED: NORMAL
RAW PRED: NORMAL
RAW: NORMAL
RV %PRED: NORMAL
RV PRED: NORMAL
RV: NORMAL
SVC %PRED: NORMAL
SVC PRED: NORMAL
SVC: NORMAL
TLC %PRED: 52 %
TLC PRED: NORMAL
TLC: NORMAL
VA %PRED: NORMAL
VA PRED: NORMAL
VA: NORMAL
VTG %PRED: NORMAL
VTG PRED: NORMAL
VTG: NORMAL

## 2022-04-19 PROCEDURE — 71250 CT THORAX DX C-: CPT

## 2022-04-19 PROCEDURE — 94060 EVALUATION OF WHEEZING: CPT

## 2022-04-19 PROCEDURE — 94727 GAS DIL/WSHOT DETER LNG VOL: CPT

## 2022-04-19 PROCEDURE — 94729 DIFFUSING CAPACITY: CPT

## 2022-04-19 ASSESSMENT — PULMONARY FUNCTION TESTS
FEV1_PERCENT_PREDICTED_POST: 47
FEV1_PERCENT_PREDICTED_PRE: 47
FEV1/FVC_POST: 101
FEV1/FVC_PRE: 101

## 2022-04-27 ENCOUNTER — OFFICE VISIT (OUTPATIENT)
Dept: ORTHOPEDIC SURGERY | Age: 67
End: 2022-04-27
Payer: COMMERCIAL

## 2022-04-27 VITALS
BODY MASS INDEX: 30.64 KG/M2 | DIASTOLIC BLOOD PRESSURE: 83 MMHG | WEIGHT: 214 LBS | HEART RATE: 86 BPM | OXYGEN SATURATION: 96 % | RESPIRATION RATE: 16 BRPM | HEIGHT: 70 IN | SYSTOLIC BLOOD PRESSURE: 130 MMHG

## 2022-04-27 DIAGNOSIS — M25.462 KNEE EFFUSION, LEFT: ICD-10-CM

## 2022-04-27 DIAGNOSIS — M23.42 LOOSE BODY OF LEFT KNEE: Primary | ICD-10-CM

## 2022-04-27 PROCEDURE — 99203 OFFICE O/P NEW LOW 30 MIN: CPT | Performed by: PHYSICIAN ASSISTANT

## 2022-04-27 RX ORDER — ANASTROZOLE 1 MG/1
TABLET ORAL
COMMUNITY
Start: 2022-04-06

## 2022-04-27 RX ORDER — LISINOPRIL 5 MG/1
10 TABLET ORAL DAILY
COMMUNITY

## 2022-04-27 RX ORDER — DULAGLUTIDE 0.75 MG/.5ML
INJECTION, SOLUTION SUBCUTANEOUS
COMMUNITY
Start: 2022-04-18

## 2022-04-27 RX ORDER — SIMVASTATIN 10 MG
10 TABLET ORAL NIGHTLY
COMMUNITY

## 2022-04-27 NOTE — PROGRESS NOTES
Review of Systems   Constitutional: Negative. HENT: Negative. Eyes: Negative. Respiratory: Negative. Cardiovascular: Negative. Gastrointestinal: Negative. Genitourinary: Negative. Musculoskeletal: Positive for arthralgias, joint swelling and myalgias. Skin: Negative. Neurological: Negative. Psychiatric/Behavioral: Negative. I reviewed and agree with the portions of the HPI, review of systems, vital documentation and plan performed by my staff and have added/addended where appropriate. HPI:  Kelli Jones is a 61 y.o. male that is in the office today with left knee pain. Patient states that a couple of weeks ago patient did a twisting motion and felt a pop within the knee. He states now he is having difficulty straightening the knee out completely. Patient states that the medial side of the knee hurts more than the lateral. Patient denies any other injury to the knee. Patient denies having a steroid injection in the knee. Patient has been using ibuprofen with slight relief. He states he does feel a stabbing type pain and also is having catching within the knee.     Patient was referred by Jose Blair PA-C  For loose joint body in left knee    Past Medical History:   Diagnosis Date    CAD (coronary atherosclerotic disease)     Centrilobular emphysema ([address] 10/3/2019    Hyperlipidemia     Hypertension     Other disorders of kidney and ureter     kidney stone    Psychiatric problem        Past Surgical History:   Procedure Laterality Date    CARDIAC CATHETERIZATION      CARDIAC SURGERY      CORONARY ANGIOPLASTY WITH STENT PLACEMENT      reports he has 1 cardiac stent    CORONARY ARTERY BYPASS GRAFT      2 vessel    HERNIA REPAIR         Family History   Problem Relation Age of Onset    High Blood Pressure Mother     Stroke Mother     Cancer Father     Early Death Brother     Heart Disease Brother     High Blood Pressure Brother     Arthritis Neg Hx     Asthma Neg Hx     Birth Defects Neg Hx     Depression Neg Hx     Diabetes Neg Hx     Hearing Loss Neg Hx     High Cholesterol Neg Hx     Kidney Disease Neg Hx     Learning Disabilities Neg Hx     Mental Illness Neg Hx     Mental Retardation Neg Hx     Miscarriages / Stillbirths Neg Hx     Substance Abuse Neg Hx     Vision Loss Neg Hx     Other Neg Hx        Social History     Socioeconomic History    Marital status:      Spouse name: None    Number of children: None    Years of education: None    Highest education level: None   Occupational History    None   Tobacco Use    Smoking status: Former Smoker     Packs/day: 1.50     Years: 30.00     Pack years: 45.00     Types: Cigarettes     Quit date: 2016     Years since quittin.7    Smokeless tobacco: Never Used   Substance and Sexual Activity    Alcohol use: No     Comment: 2015     Drug use: No    Sexual activity: Yes     Partners: Female   Other Topics Concern    None   Social History Narrative    None     Social Determinants of Health     Financial Resource Strain:     Difficulty of Paying Living Expenses: Not on file   Food Insecurity:     Worried About Running Out of Food in the Last Year: Not on file    Jose of Food in the Last Year: Not on file   Transportation Needs:     Lack of Transportation (Medical): Not on file    Lack of Transportation (Non-Medical):  Not on file   Physical Activity:     Days of Exercise per Week: Not on file    Minutes of Exercise per Session: Not on file   Stress:     Feeling of Stress : Not on file   Social Connections:     Frequency of Communication with Friends and Family: Not on file    Frequency of Social Gatherings with Friends and Family: Not on file    Attends [employer] Services: Not on file    Active Member of Clubs or Organizations: Not on file    Attends Club or Organization Meetings: Not on file    Marital Status: Not on file   Intimate Partner Violence:     Fear of Current or Ex-Partner: Not on file    Emotionally Abused: Not on file    Physically Abused: Not on file    Sexually Abused: Not on file   Housing Stability:     Unable to Pay for Housing in the Last Year: Not on file    Number of Juan Luis in the Last Year: Not on file    Unstable Housing in the Last Year: Not on file       Current Outpatient Medications   Medication Sig Dispense Refill    aspirin 81 MG EC tablet Take 1 tablet by mouth daily 30 tablet 3    atorvastatin (LIPITOR) 80 MG tablet Take 1 tablet by mouth nightly 30 tablet 3    losartan (COZAAR) 50 MG tablet Take 1 tablet by mouth daily 30 tablet 3    clopidogrel (PLAVIX) 75 MG tablet Take 1 tablet by mouth daily 30 tablet 3    rOPINIRole (REQUIP) 1 MG tablet Take 1 mg by mouth nightly      albuterol sulfate HFA (VENTOLIN HFA) 108 (90 Base) MCG/ACT inhaler Inhale 2 puffs into the lungs every 6 hours as needed for Wheezing      glycopyrrolate-formoterol (BEVESPI AEROSPHERE) 9-4.8 MCG/ACT AERO Inhale 2 puffs into the lungs 2 times daily 1 Inhaler 5    nitroGLYCERIN (NITROSTAT) 0.4 MG SL tablet up to max of 3 total doses. If no relief after 1 dose, call 911. 25 tablet 0    carvedilol (COREG) 12.5 MG tablet Take 1 tablet by mouth 2 times daily (with meals) 60 tablet 3    anastrozole (ARIMIDEX) 1 MG tablet TAKE 1 TABLET BY MOUTH WEEKLY      TRULICITY 7.57 KL/5.9UI SOPN       simvastatin (ZOCOR) 10 MG tablet Take 10 mg by mouth nightly      lisinopril (PRINIVIL;ZESTRIL) 5 MG tablet Take 10 mg by mouth daily      metFORMIN (GLUCOPHAGE) 850 MG tablet Take 1 tablet by mouth 2 times daily (with meals) (Patient not taking: Reported on 4/27/2022) 60 tablet 3    escitalopram (LEXAPRO) 10 MG tablet Take 10 mg by mouth daily (Patient not taking: Reported on 4/27/2022)       No current facility-administered medications for this visit.        No Known Allergies    Review of Systems:  See above      Physical Exam:   /83 (Site: Left Upper Arm, Position: Sitting, Cuff Size: Medium Adult)   Pulse 86   Resp 16   Ht 5' 10\" (1.778 m)   Wt 214 lb (97.1 kg)   SpO2 96%   BMI 30.71 kg/m²        Gait is Antalgic. The patient can bear weight on the injured extremity. Gen/Psych:Examination reveals a pleasant individual in no acute distress. The patient is oriented to time, place and person. The patient's mood and affect are appropriate. Patient appears well nourished. Body habitus is overweight     Lymph:  no lymphedema in bilateral lower extremities     Skin intact in bilateral lower extremities with no ulcerations, lesions, rash, erythema. Vascular: There are no varicosities in bilateral lower extremities, sensation intact to light touch over bilateral lower extremities. left leg/knee exam:  Leg alignment:     neutral  Quadriceps/hamstring atrophy:   no  Knee effusion:    mild   Knee erythema:   no  ROM:     5-115°  Lachman:    no  Pivot Shift:    no  Posterior drawer:   no    Increased ER at 30 deg's:  no  Varus laxity at 0 and 30 deg's: no  Valguslaxity at 0 and 30 deg's: no  Recurvatum:    no  Tenderness at:   Mild to moderate pain along the medial joint line. Pain with medial Lea. Knee strength is 5/5 flexion and extension  There is + L2-S1 motor and sensory function. Outside record review: office notes    Imaging studies: X-rays from 4/7/2022 were reviewed. Findings are as follows: FINDINGS:   Mild tricompartmental degenerative changes. Darby Regan is a probable 1.5 x 0.6 cm   loose body predominately within the lateral compartment.  Small to moderate   joint effusion.  No acute fracture or dislocation.  Vascular calcifications.           Impression   Mild degenerative changes with a 1.5 x 0.6 cm loose body.  No acute osseous   abnormality. Impression:     Diagnosis Orders   1. Loose body of left knee  MRI KNEE LEFT WO CONTRAST   2.  Knee effusion, left     (Evaluate for meniscus tear and loose joint body)        Plan:    Patient Instructions   MRI of left knee  Follow-up in 2-3 weeks to go over MRI.   Weightbearing as tolerated  Over-the-counter medication as needed for pain

## 2022-04-27 NOTE — PATIENT INSTRUCTIONS
MRI of left knee  Follow-up in 2-3 weeks to go over MRI.   Weightbearing as tolerated  Over-the-counter medication as needed for pain

## 2022-05-03 ASSESSMENT — ENCOUNTER SYMPTOMS
GASTROINTESTINAL NEGATIVE: 1
RESPIRATORY NEGATIVE: 1
EYES NEGATIVE: 1

## 2022-05-06 ENCOUNTER — HOSPITAL ENCOUNTER (OUTPATIENT)
Dept: MRI IMAGING | Age: 67
Discharge: HOME | End: 2022-05-06
Payer: COMMERCIAL

## 2022-05-06 DIAGNOSIS — M23.42 LOOSE BODY OF LEFT KNEE: ICD-10-CM

## 2022-05-06 PROCEDURE — 73721 MRI JNT OF LWR EXTRE W/O DYE: CPT

## 2022-05-11 ENCOUNTER — OFFICE VISIT (OUTPATIENT)
Dept: ORTHOPEDIC SURGERY | Age: 67
End: 2022-05-11
Payer: COMMERCIAL

## 2022-05-11 VITALS
WEIGHT: 206.1 LBS | BODY MASS INDEX: 29.51 KG/M2 | SYSTOLIC BLOOD PRESSURE: 167 MMHG | RESPIRATION RATE: 16 BRPM | OXYGEN SATURATION: 96 % | HEIGHT: 70 IN | HEART RATE: 74 BPM | DIASTOLIC BLOOD PRESSURE: 110 MMHG

## 2022-05-11 DIAGNOSIS — M23.42 LOOSE BODY OF LEFT KNEE: Primary | ICD-10-CM

## 2022-05-11 PROCEDURE — 99212 OFFICE O/P EST SF 10 MIN: CPT | Performed by: PHYSICIAN ASSISTANT

## 2022-05-11 ASSESSMENT — ENCOUNTER SYMPTOMS
RESPIRATORY NEGATIVE: 1
EYES NEGATIVE: 1
GASTROINTESTINAL NEGATIVE: 1

## 2022-05-11 NOTE — PROGRESS NOTES
Review of Systems   Constitutional: Negative. HENT: Negative. Eyes: Negative. Respiratory: Negative. Cardiovascular: Negative. Gastrointestinal: Negative. Genitourinary: Negative. Musculoskeletal: Positive for arthralgias and myalgias. Skin: Negative. Neurological: Negative. Psychiatric/Behavioral: Negative. Lisette Chaidez is a 61 y.o. male who presents back to the office to go over MRI of his left knee. He states that he continues to have pain in the left knee periodically and sometimes it will be on the lateral side of the knee and sometimes it feels like it shifts and goes to the posterior aspect of the lateral part of the knee. He does feel like there is something that gets caught in the knee at times. He has not had any falls recently.       Past Medical History:   Diagnosis Date    CAD (coronary atherosclerotic disease)     Centrilobular emphysema ([facility] [address] 10/3/2019    Hyperlipidemia     Hypertension     Other disorders of kidney and ureter     kidney stone    Psychiatric problem        Past Surgical History:   Procedure Laterality Date    CARDIAC CATHETERIZATION      CARDIAC SURGERY      CORONARY ANGIOPLASTY WITH STENT PLACEMENT      reports he has 1 cardiac stent    CORONARY ARTERY BYPASS GRAFT      2 vessel    HERNIA REPAIR         Family History   Problem Relation Age of Onset    High Blood Pressure Mother     Stroke Mother     Cancer Father     Early Death Brother     Heart Disease Brother     High Blood Pressure Brother     Arthritis Neg Hx     Asthma Neg Hx     Birth Defects Neg Hx     Depression Neg Hx     Diabetes Neg Hx     Hearing Loss Neg Hx     High Cholesterol Neg Hx     Kidney Disease Neg Hx     Learning Disabilities Neg Hx     Mental Illness Neg Hx     Mental Retardation Neg Hx     Miscarriages / Stillbirths Neg Hx     Substance Abuse Neg Hx     Vision Loss Neg Hx     Other Neg Hx        Social History     Socioeconomic History    Marital status:      Spouse name: None    Number of children: None    Years of education: None    Highest education level: None   Occupational History    None   Tobacco Use    Smoking status: Former Smoker     Packs/day: 1.50     Years: 30.00     Pack years: 45.00     Types: Cigarettes     Quit date: 2016     Years since quittin.7    Smokeless tobacco: Never Used   Substance and Sexual Activity    Alcohol use: No     Comment: 2015     Drug use: No    Sexual activity: Yes     Partners: Female   Other Topics Concern    None   Social History Narrative    None     Social Determinants of Health     Financial Resource Strain:     Difficulty of Paying Living Expenses: Not on file   Food Insecurity:     Worried About Running Out of Food in the Last Year: Not on file    Jose of Food in the Last Year: Not on file   Transportation Needs:     Lack of Transportation (Medical): Not on file    Lack of Transportation (Non-Medical):  Not on file   Physical Activity:     Days of Exercise per Week: Not on file    Minutes of Exercise per Session: Not on file   Stress:     Feeling of Stress : Not on file   Social Connections:     Frequency of Communication with Friends and Family: Not on file    Frequency of Social Gatherings with Friends and Family: Not on file    Attends [employer] Services: Not on file    Active Member of [address] [place] [address] or Organizations: Not on file    Attends Club or Organization Meetings: Not on file    Marital Status: Not on file   Intimate Partner Violence:     Fear of Current or Ex-Partner: Not on file    Emotionally Abused: Not on file    Physically Abused: Not on file    Sexually Abused: Not on file   Housing Stability:     Unable to Pay for Housing in the Last Year: Not on file    Number of [place] in the Last Year: Not on file    Unstable Housing in the Last Year: Not on file       Current Outpatient Medications   Medication Sig Dispense Refill  anastrozole (ARIMIDEX) 1 MG tablet TAKE 1 TABLET BY MOUTH WEEKLY      TRULICITY 6.42 BU/0.7XB SOPN       simvastatin (ZOCOR) 10 MG tablet Take 10 mg by mouth nightly      lisinopril (PRINIVIL;ZESTRIL) 5 MG tablet Take 10 mg by mouth daily      aspirin 81 MG EC tablet Take 1 tablet by mouth daily 30 tablet 3    atorvastatin (LIPITOR) 80 MG tablet Take 1 tablet by mouth nightly 30 tablet 3    losartan (COZAAR) 50 MG tablet Take 1 tablet by mouth daily 30 tablet 3    clopidogrel (PLAVIX) 75 MG tablet Take 1 tablet by mouth daily 30 tablet 3    rOPINIRole (REQUIP) 1 MG tablet Take 1 mg by mouth nightly      albuterol sulfate HFA (VENTOLIN HFA) 108 (90 Base) MCG/ACT inhaler Inhale 2 puffs into the lungs every 6 hours as needed for Wheezing      glycopyrrolate-formoterol (BEVESPI AEROSPHERE) 9-4.8 MCG/ACT AERO Inhale 2 puffs into the lungs 2 times daily 1 Inhaler 5    nitroGLYCERIN (NITROSTAT) 0.4 MG SL tablet up to max of 3 total doses. If no relief after 1 dose, call 911. 25 tablet 0    carvedilol (COREG) 12.5 MG tablet Take 1 tablet by mouth 2 times daily (with meals) 60 tablet 3    metFORMIN (GLUCOPHAGE) 850 MG tablet Take 1 tablet by mouth 2 times daily (with meals) (Patient not taking: Reported on 4/27/2022) 60 tablet 3    escitalopram (LEXAPRO) 10 MG tablet Take 10 mg by mouth daily (Patient not taking: Reported on 4/27/2022)       No current facility-administered medications for this visit. No Known Allergies    Review of Systems:  See above      Physical Exam:   BP (!) 167/110 (Site: Left Upper Arm, Position: Sitting, Cuff Size: Medium Adult)   Pulse 74   Resp 16   Ht 5' 10\" (1.778 m)   Wt 206 lb 1.6 oz (93.5 kg)   SpO2 96%   BMI 29.57 kg/m²        Gait is Antalgic. Gen/Psych:Examination reveals a pleasant individual in no acute distress. The patient is oriented to time, place and person. The patient's mood and affect are appropriate. Patient appears well nourished.  Body habitus is mildly overweight     Lymph:  No significant lymphedema in bilateral lower extremities     Skin intact in bilateral lower extremities with no ulcerations, lesions, rash, erythema. Vascular: There are mild varicosities in bilateral lower extremities, sensation intact to light touch over bilateral lower extremities. Left Knee:  Patient has pain over the lateral joint line, with mild knee joint effusion. No varus or valgus instability on testing of the left knee. Range of motion of the left knee 0-130 degrees. Imaging studies:  MRI left knee  Impression   Loose intra-articular body measuring 13 x 13 mm in the anterior aspect of the   intercondylar groove, compatible with the findings of prior radiographs.       Degenerative changes in the medial and patellofemoral compartments as   described above, including areas of full-thickness chondral loss involving   the medial femoral condyle and trochlear groove.  Trace joint effusion with   evidence of synovitis.       Small intact Baker's cyst, also with evidence of synovitis.       No meniscal or ligamentous injury.               Impression:    Diagnosis Orders   1. Loose body of left knee             Plan:    Because of the mechanical symptoms that you are experiencing I do recommend that he follow-up with an orthopedic surgeon to discuss possibly getting an arthroscopy of the left knee with loose body removal.  Continue off work until follow-up with orthopedic surgeon and then at that point he will discuss whether or not you need to be off further depending on if he recommends surgery or not. I did discuss with him possible nonsurgical treatments but he really wanted to discuss surgery because of his symptoms.   Patient Instructions   Follow up with audra to discuss arthroscopic surgery

## 2022-05-20 ENCOUNTER — OFFICE VISIT (OUTPATIENT)
Dept: ORTHOPEDIC SURGERY | Age: 67
End: 2022-05-20
Payer: COMMERCIAL

## 2022-05-20 VITALS
HEART RATE: 80 BPM | RESPIRATION RATE: 16 BRPM | DIASTOLIC BLOOD PRESSURE: 102 MMHG | SYSTOLIC BLOOD PRESSURE: 179 MMHG | OXYGEN SATURATION: 93 % | HEIGHT: 70 IN | WEIGHT: 214 LBS | BODY MASS INDEX: 30.64 KG/M2

## 2022-05-20 DIAGNOSIS — M17.12 PRIMARY OSTEOARTHRITIS OF LEFT KNEE: Primary | ICD-10-CM

## 2022-05-20 DIAGNOSIS — M25.562 CHRONIC PAIN OF LEFT KNEE: ICD-10-CM

## 2022-05-20 DIAGNOSIS — M23.42 LOOSE BODY IN KNEE, LEFT KNEE: ICD-10-CM

## 2022-05-20 DIAGNOSIS — G89.29 CHRONIC PAIN OF LEFT KNEE: ICD-10-CM

## 2022-05-20 PROCEDURE — 99214 OFFICE O/P EST MOD 30 MIN: CPT | Performed by: ORTHOPAEDIC SURGERY

## 2022-05-20 ASSESSMENT — ENCOUNTER SYMPTOMS
COLOR CHANGE: 0
WHEEZING: 0
EYE PAIN: 0
SHORTNESS OF BREATH: 0
CHEST TIGHTNESS: 0
VOMITING: 0
EYE REDNESS: 0

## 2022-05-20 NOTE — PATIENT INSTRUCTIONS
We will schedule surgery at soonest convenience.  If you have any questions regarding your surgery please call our office and ask to speak with Angelito Rule [phone]

## 2022-05-20 NOTE — PROGRESS NOTES
5/20/2022   Chief Complaint   Patient presents with    Knee Pain     left knee MRI        History of Present Illness:                             Katelynn Zurita is a 61 y.o. male who presents today for evaluation of his left knee pain and stiffness and mechanical catching and popping. He has dealt with several months of progressive worsening dysfunction and anemia which has been severe over the last 2 months. Symptoms of been severe enough that he has been unable to work and has been held off of work and treated with conservative measures through his primary care physician. He has failed rest and home exercise program and anti-inflammatory medications and bracing. He was seen by the physician assistant who obtained x-rays and followed up with an MRI due to the presence of a large calcified intra-articular loose body. Patient has a severe aching pain that is limiting his ability to exercise and be active and also has prevent him from being able to perform his job duties. He has severe pain with squatting or climbing stairs. Pain is diffuse throughout the knee including both the medial lateral joint lines and anteriorly along the kneecap. He gets occasional catching and locking and grinding sensations with deep flexion activities. Patient presents to the office with left knee pain. Pt has had the pain since march and stated it has progressively gotten worse. Pt has seen [facility] where he had an MRI completed. Pt has had issues with aching and sharp pains when weightbearing. Pt stated he he also has instability that will increased with the up and down at work. Pt stated his pain today is about a 6/10 and has been taking tylenol for it.                         Medical History  Patient's medications, allergies, past medical, surgical, social and family histories were reviewed and updated as appropriate.     Past Medical History:   Diagnosis Date    CAD (coronary atherosclerotic disease)     Centrilobular emphysema ([facility] [address] 10/3/2019    Hyperlipidemia     Hypertension     Other disorders of kidney and ureter     kidney stone    Psychiatric problem      Past Surgical History:   Procedure Laterality Date    CARDIAC CATHETERIZATION      CARDIAC SURGERY      CORONARY ANGIOPLASTY WITH STENT PLACEMENT      reports he has 1 cardiac stent    CORONARY ARTERY BYPASS GRAFT      2 vessel    HERNIA REPAIR       Family History   Problem Relation Age of Onset    High Blood Pressure Mother     Stroke Mother     Cancer Father     Early Death Brother     Heart Disease Brother     High Blood Pressure Brother     Arthritis Neg Hx     Asthma Neg Hx     Birth Defects Neg Hx     Depression Neg Hx     Diabetes Neg Hx     Hearing Loss Neg Hx     High Cholesterol Neg Hx     Kidney Disease Neg Hx     Learning Disabilities Neg Hx     Mental Illness Neg Hx     Mental Retardation Neg Hx     Miscarriages / Stillbirths Neg Hx     Substance Abuse Neg Hx     Vision Loss Neg Hx     Other Neg Hx      Social History     Socioeconomic History    Marital status:      Spouse name: None    Number of children: None    Years of education: None    Highest education level: None   Occupational History    None   Tobacco Use    Smoking status: Former Smoker     Packs/day: 1.50     Years: 30.00     Pack years: 45.00     Types: Cigarettes     Quit date: 2016     Years since quittin.8    Smokeless tobacco: Never Used   Substance and Sexual Activity    Alcohol use: No     Comment: 2015     Drug use: No    Sexual activity: Yes     Partners: Female   Other Topics Concern    None   Social History Narrative    None     Social Determinants of Health     Financial Resource Strain:     Difficulty of Paying Living Expenses: Not on file   Food Insecurity:     Worried About Running Out of Food in the Last Year: Not on file    Jose of Food in the Last Year: Not on file   Transportation Needs:     Lack of Transportation (Medical): Not on file    Lack of Transportation (Non-Medical): Not on file   Physical Activity:     Days of Exercise per Week: Not on file    Minutes of Exercise per Session: Not on file   Stress:     Feeling of Stress : Not on file   Social Connections:     Frequency of Communication with Friends and Family: Not on file    Frequency of Social Gatherings with Friends and Family: Not on file    Attends [employer] Services: Not on file    Active Member of [address] [place] [address] or Organizations: Not on file    Attends Club or Organization Meetings: Not on file    Marital Status: Not on file   Intimate Partner Violence:     Fear of Current or Ex-Partner: Not on file    Emotionally Abused: Not on file    Physically Abused: Not on file    Sexually Abused: Not on file   Housing Stability:     Unable to Pay for Housing in the Last Year: Not on file    Number of [place] in the Last Year: Not on file    Unstable Housing in the Last Year: Not on file     Current Outpatient Medications   Medication Sig Dispense Refill    anastrozole (ARIMIDEX) 1 MG tablet TAKE 1 TABLET BY MOUTH WEEKLY      TRULICITY 4.27 JE/6.2KL SOPN       simvastatin (ZOCOR) 10 MG tablet Take 10 mg by mouth nightly      lisinopril (PRINIVIL;ZESTRIL) 5 MG tablet Take 10 mg by mouth daily      aspirin 81 MG EC tablet Take 1 tablet by mouth daily 30 tablet 3    losartan (COZAAR) 50 MG tablet Take 1 tablet by mouth daily 30 tablet 3    clopidogrel (PLAVIX) 75 MG tablet Take 1 tablet by mouth daily 30 tablet 3    rOPINIRole (REQUIP) 1 MG tablet Take 1 mg by mouth nightly      albuterol sulfate HFA (VENTOLIN HFA) 108 (90 Base) MCG/ACT inhaler Inhale 2 puffs into the lungs every 6 hours as needed for Wheezing      glycopyrrolate-formoterol (BEVESPI AEROSPHERE) 9-4.8 MCG/ACT AERO Inhale 2 puffs into the lungs 2 times daily 1 Inhaler 5    nitroGLYCERIN (NITROSTAT) 0.4 MG SL tablet up to max of 3 total doses.  If no relief after 1 dose, call 911. 25 tablet 0    carvedilol (COREG) 12.5 MG tablet Take 1 tablet by mouth 2 times daily (with meals) 60 tablet 3    atorvastatin (LIPITOR) 80 MG tablet Take 1 tablet by mouth nightly (Patient not taking: Reported on 5/20/2022) 30 tablet 3    metFORMIN (GLUCOPHAGE) 850 MG tablet Take 1 tablet by mouth 2 times daily (with meals) (Patient not taking: Reported on 4/27/2022) 60 tablet 3    escitalopram (LEXAPRO) 10 MG tablet Take 10 mg by mouth daily (Patient not taking: Reported on 4/27/2022)       No current facility-administered medications for this visit. No Known Allergies      Review of Systems   Constitutional: Negative for chills and fever. HENT: Negative for congestion and sneezing. Eyes: Negative for pain and redness. Respiratory: Negative for chest tightness, shortness of breath and wheezing. Cardiovascular: Negative for chest pain and palpitations. Gastrointestinal: Negative for vomiting. Musculoskeletal: Positive for arthralgias. Skin: Negative for color change and rash. Neurological: Negative for weakness and numbness. Psychiatric/Behavioral: Negative for agitation. The patient is not nervous/anxious. Examination:  General Exam:  Vitals: BP (!) 179/102   Pulse 80   Resp 16   Ht 5' 10\" (1.778 m)   Wt 214 lb (97.1 kg)   SpO2 93%   BMI 30.71 kg/m²    Physical Exam  Vitals and nursing note reviewed. Constitutional:       Appearance: Normal appearance. HENT:      Head: Normocephalic and atraumatic. Eyes:      Conjunctiva/sclera: Conjunctivae normal.      Pupils: Pupils are equal, round, and reactive to light. Pulmonary:      Effort: Pulmonary effort is normal.   Musculoskeletal:      Cervical back: Normal range of motion. Right hip: Normal.      Left hip: No deformity, tenderness, bony tenderness or crepitus. Normal range of motion. Normal strength.       Right knee: No swelling, deformity, effusion, erythema, ecchymosis or bony tenderness. Normal range of motion. No medial joint line or lateral joint line tenderness. No LCL laxity or MCL laxity. Normal alignment and normal patellar mobility. Comments: Left Lower Extremity:    There is moderate to severe tenderness to palpation diffusely throughout the knee, most significant along the anterior joint line including both medial and lateral joint lines. There is a mild knee joint effusion present and mild global swelling anteriorly. Mild restricted range of motion at the knee with approximately 5 degrees lack of full extension and knee flexion up to 130 degrees with pain at the extremes of motion. There is mild crepitation during active range of motion. There is mild varus knee alignment. There is 5 out of 5 strength with knee flexion and extension. There is no instability to varus or valgus stress testing and anterior and posterior drawer testing. Sensation is intact to light touch throughout the lower extremity. There is positive medial Lea's test with tenderness to palpation and pain along the medial joint line. Skin is intact. Pulses are intact    No pain with active range of motion of the hip. Strength and range of motion of the hip are intact. No tenderness to palpation at the hip. Skin:     General: Skin is warm and dry. Neurological:      Mental Status: He is alert and oriented to person, place, and time.    Psychiatric:         Mood and Affect: Mood normal.         Behavior: Behavior normal.            Diagnostic testing:  X-ray images were reviewed by myself and discussed with the patient:  X-ray images of the left knee from 4/7/2022 were reviewed by myself and discussed with the patient today:  There is evidence of tricompartmental degenerative joint disease most prominent in the medial and patellofemoral compartments where there is evidence of joint space narrowing on both sides of each compartment along with irregular surfaces and subchondral sclerosis present. Normal alignment of the knee joint. No fracture or acute abnormality. Prominent large calcified loose body at the anterior lateral aspect of the knee measuring 1.5 x 0.6 cm    MRI images of the left knee were reviewed by myself and discussed with the patient today:  MRI completed: 05/06/2022  Impression   Loose intra-articular body measuring 13 x 13 mm in the anterior aspect of the   intercondylar groove, compatible with the findings of prior radiographs.       Degenerative changes in the medial and patellofemoral compartments as   described above, including areas of full-thickness chondral loss involving   the medial femoral condyle and trochlear groove.  Trace joint effusion with   evidence of synovitis.       Small intact Baker's cyst, also with evidence of synovitis.       No meniscal or ligamentous injury.        Office Procedures:  No orders of the defined types were placed in this encounter. Assessment and Plan  1. Left knee tricompartmental primary osteoarthritis    2. Left knee intra-articular loose body    We discussed the severity of the degenerative findings on the MRI, x-rays and physical exam.  We discussed the prominent intra-articular loose body which is likely a large cartilage fragments that has displaced from the medial femoral condyle. We discussed treatment options related to his degenerative joint disease and symptomatic loose body. I am concerned that most of his symptoms are related to the loss of articular cartilage and that knee arthroscopy is unlikely to provide long-lasting pain relief or improved function at the knee. The patient feels that they have exhausted conservative measures. The patient has previously tried rest, home exercise program, anti-inflammatory medications, and activity modification. The pain level is severe and bothers the patient on a daily basis, including interrupting sleep at night.   Activities of daily living are difficult to perform. He has been unable to get back to his normal activities. The patient has trouble performing prolonged standing or walking activities, getting up from a seated position, climbing stairs, and has fear of inability to resume his work or recreational activities    The pain and dysfunction at the knee are severely limiting at this stage and the patient would like to consider surgical intervention. I have recommended surgical intervention for a total knee replacement. We discussed the risks, benefits, and alternatives to surgery. We discussed the intended benefits from a well-functioning replacement and the anticipated recovery process. We discussed potential risks and complications including but not limited to DVT/PE, infection, stiffness, loosening, and fracture. We discussed pain control, physical therapy, and anticoagulation during the perioperative timeframe. The patient would like to proceed with knee replacement surgery and will be enrolled in the joint replacement class    Plan will be to proceed with a robotic assisted total knee replacement. He will be on aspirin postoperatively for DVT prophylaxis. Due to the severity of his pain and dysfunction, I have written a note to remain off from work in preparation for his upcoming surgery.       Electronically signed by Aidan Pederson MD on 5/20/2022 at 1:44 PM

## 2022-05-20 NOTE — PROGRESS NOTES
Patient presents to the office with left knee pain. Pt has had the pain since march and stated it has progressively gotten worse. Pt has seen Mita Andrade where he had an MRI completed. Pt has had issues with aching and sharp pains when weightbearing. Pt stated he he also has instability that will increased with the up and down at work. Pt stated his pain today is about a 6/10 and has been taking tylenol for it. MRI completed: 05/06/2022  Impression   Loose intra-articular body measuring 13 x 13 mm in the anterior aspect of the   intercondylar groove, compatible with the findings of prior radiographs.       Degenerative changes in the medial and patellofemoral compartments as   described above, including areas of full-thickness chondral loss involving   the medial femoral condyle and trochlear groove.  Trace joint effusion with   evidence of synovitis.       Small intact Baker's cyst, also with evidence of synovitis.       No meniscal or ligamentous injury.

## 2022-05-27 ENCOUNTER — HOSPITAL ENCOUNTER (OUTPATIENT)
Dept: CT IMAGING | Age: 67
Discharge: HOME | End: 2022-05-27
Payer: COMMERCIAL

## 2022-05-27 DIAGNOSIS — M25.562 CHRONIC PAIN OF LEFT KNEE: ICD-10-CM

## 2022-05-27 DIAGNOSIS — M17.12 PRIMARY OSTEOARTHRITIS OF LEFT KNEE: ICD-10-CM

## 2022-05-27 DIAGNOSIS — G89.29 CHRONIC PAIN OF LEFT KNEE: ICD-10-CM

## 2022-05-27 PROCEDURE — 73700 CT LOWER EXTREMITY W/O DYE: CPT

## 2022-06-01 ENCOUNTER — HOSPITAL ENCOUNTER (OUTPATIENT)
Dept: PHYSICAL THERAPY | Age: 67
Setting detail: THERAPIES SERIES
Discharge: HOME | End: 2022-06-01
Payer: COMMERCIAL

## 2022-06-01 PROCEDURE — 97110 THERAPEUTIC EXERCISES: CPT

## 2022-06-01 PROCEDURE — 97161 PT EVAL LOW COMPLEX 20 MIN: CPT

## 2022-06-01 RX ORDER — SODIUM CHLORIDE, SODIUM LACTATE, POTASSIUM CHLORIDE, CALCIUM CHLORIDE 600; 310; 30; 20 MG/100ML; MG/100ML; MG/100ML; MG/100ML
INJECTION, SOLUTION INTRAVENOUS ONCE
Status: CANCELLED | OUTPATIENT
Start: 2022-06-20

## 2022-06-01 ASSESSMENT — PAIN DESCRIPTION - ONSET: ONSET: GRADUAL

## 2022-06-01 ASSESSMENT — PAIN DESCRIPTION - ORIENTATION: ORIENTATION: LEFT;ANTERIOR;POSTERIOR

## 2022-06-01 ASSESSMENT — PAIN DESCRIPTION - PAIN TYPE: TYPE: CHRONIC PAIN

## 2022-06-01 ASSESSMENT — PAIN - FUNCTIONAL ASSESSMENT: PAIN_FUNCTIONAL_ASSESSMENT: PREVENTS OR INTERFERES SOME ACTIVE ACTIVITIES AND ADLS

## 2022-06-01 ASSESSMENT — PAIN DESCRIPTION - DESCRIPTORS: DESCRIPTORS: ACHING;DULL;SHARP

## 2022-06-01 ASSESSMENT — PAIN DESCRIPTION - LOCATION: LOCATION: KNEE

## 2022-06-01 ASSESSMENT — PAIN DESCRIPTION - FREQUENCY: FREQUENCY: INTERMITTENT

## 2022-06-01 ASSESSMENT — PAIN SCALES - GENERAL: PAINLEVEL_OUTOF10: 1

## 2022-06-01 NOTE — PROGRESS NOTES
aspirin 81 MG EC tablet, Take 1 tablet by mouth daily, Disp: 30 tablet, Rfl: 3    losartan (COZAAR) 50 MG tablet, Take 1 tablet by mouth daily, Disp: 30 tablet, Rfl: 3    clopidogrel (PLAVIX) 75 MG tablet, Take 1 tablet by mouth daily, Disp: 30 tablet, Rfl: 3    rOPINIRole (REQUIP) 1 MG tablet, Take 1 mg by mouth nightly, Disp: , Rfl:     albuterol sulfate HFA (VENTOLIN HFA) 108 (90 Base) MCG/ACT inhaler, Inhale 2 puffs into the lungs every 6 hours as needed for Wheezing, Disp: , Rfl:     nitroGLYCERIN (NITROSTAT) 0.4 MG SL tablet, up to max of 3 total doses. If no relief after 1 dose, call 911., Disp: 25 tablet, Rfl: 0    carvedilol (COREG) 12.5 MG tablet, Take 1 tablet by mouth 2 times daily (with meals), Disp: 60 tablet, Rfl: 3  Allergies: Patient has no known allergies. SUBJECTIVE EXAMINATION      ,           Subjective History:    Subjective: End of March with increase in pain. Previously without issues with occasional aches. Refer to Dr. Elijah Arauz with X-rays with loose cartilage with MRI with added degeneration. Decision best to have replacement. DOS: 6/20/22. Plan to go home next day with plan to return here for outpatient for therapy. Owns rolling walker, cane, no shower chair. Wife will be home to assist for needs.   Additional Pertinent Hx (if applicable):     Prior diagnostic testing[de-identified] X-ray      Learning/Language: Learning  Does the patient/guardian have any barriers to learning?: No barriers  What is the preferred language of the patient/guardian?: English  Is an  required?: No  How does the patient/guardian prefer to learn new concepts?: Listening,Demonstration     Pain Screening   Pain Screening  Patient Currently in Pain: Yes  Pain Assessment: 0-10  Pain Level: 1  Best Pain Level: 0  Worst Pain Level: 10  Patient's Stated Pain Goal: 0 - No pain  Pain Type: Chronic pain  Pain Location: Knee  Pain Orientation: Left,Anterior,Posterior  Pain Descriptors: Aching,Dull,Sharp  Pain Frequency: Intermittent  Pain Onset: Gradual  Functional Pain Assessment: Prevents or interferes some active activities and ADLs  Aggravating factors: Walking,Standing,Lifting  Pain Management/Relieving Factors: Medications,Ice    Functional Status    Dominant Hand: : Right    Social History:  Social History  Lives With: Spouse  Type of Home: House  Home Layout: One level,Laundry in basement  Home Access: Stairs to enter with rails  Entrance Stairs - Rails: Right  Entrance Stairs - Number of Steps: 2  Bathroom Shower/Tub: Tub/Shower unit  Bathroom Toilet: Standard  Bathroom Equipment: Grab bars in shower    Occupation/Interests:  Occupation: Retired  Leisure & Hobbies: riding the bike    Prior Level of Function:    Independent        Current Level of Function:         ADL Assistance: Independent  Homemaking Assistance: Independent  Ambulation Assistance: Independent  Transfer Assistance: Independent  Active : Yes  Mode of Transportation: Car    OBJECTIVE EXAMINATION     Review of Systems:  Vision: Within Functional Limits  Hearing: Within functional limits  Overall Orientation Status: Within Normal Limits  Follows Commands: Within Functional Limits      Observations:  General Observations  Description: able to stand from chair without UE assistance    5x sit to stand: 12.55 sec with min discomfort. 3/4 squat with min discomfort. Palpation:   Left Knee Palpation: No pain with palpation. Ambulation/Gait (if applicable):   Normal gait mechanics    Balance Screen:  Balance  Single Stance R Leg: 3  Single Stance L Leg: 3  Comments: no pain with testing.     Left AROM  Right AROM      General AROM LE: Right WFL,Left WFL  AROM LLE (degrees)  L Knee Flexion 0-145: 140  L Knee Extension 0: 0 General AROM LE: Right WFL,Left WFL  AROM RLE (degrees)  R Knee Flexion 0-145: 140  R Knee Extension 0: 0     Left PROM  Right PROM                    Left Strength  Right Strength      General Strength Testing LE: Right WNL,Left WNL (No increase in pain with testing.)  Strength LLE  L Knee Flexion: 4+/5  L Knee Extension: 4+/5 General Strength Testing LE: Right WNL,Left WNL (No increase in pain with testing.)        Joint Mobility (if applicable):   Joint Integrity Knee  Left Patella Glides: min crepitus with superior/inferior glides. Additional Finding(s) (if applicable):    KOOS: 56/86 disability        ASSESSMENT     Impression:   Pt is 61year old male with expected L TKA on 6/20/22. Pt now has difficulties completing closed chain activities including stairs and sit to stand and prolonged weightbearing activities with increasing pain. Pt demo deficits this date that include pain, flexibility restrictions and weakness. Pt educated on proper gait with AD and stair negotiation. Issued handout for exercises prior and post surgery until return to outpatient PT services. Pt will benefit with PT services with strength/ROM, gait training, manual and modalities post surgery to maximize function. Pt prior to onset of current condition had min/no pain with able to complete full ADLs and work activities. Patient received education on their current pathology and how their condition effects them with their functional activities. Patient understood discussion and questions were answered. Patient understands their activity limitations and understands rational for treatment progression. Body Structures, Functions, Activity Limitations Requiring Skilled Therapeutic Intervention: Decreased functional mobility ,Decreased ROM,Decreased strength,Decreased endurance,Decreased balance    Statement of Medical Necessity: Physical Therapy is both indicated and medically necessary as outlined in the POC to increase the likelihood of meeting the functionally related goals stated below.      Patient's Activity Tolerance: Patient tolerated evaluation without incident      Patient's rehabilitation potential/prognosis is considered to be: Good    Factors which may impact rehabilitation potential include: None        GOALS   Patient Goal(s): improve pain and function. Short Term Goals Completed by Defer to Long Term Goals Goal Status   Short term goals (All Goals MET)   Time Frame for Long term goals : In this visit, patient will  Long term goal 1: demonstrate good understanding of exercise progression post surgery. Long term goal 2: demonstrate good understanding of walker use post surgery    Long term goal 3: demonstrate good understanding of ascending/descending stairs after surgery. Long term goal 4: watch [id number] Video      Time Frame for Long term goals: Re-assess post surgery for appropriate goals. Long Term Goals Completed by   Goal Status                                      TREATMENT PLAN       Requires PT Follow-Up: No  Specific Instructions for Next Treatment: review HEP, functional progression as tolerated. Pt. actively involved in establishing Plan of Care and Goals: Yes  Patient/ Caregiver education and instruction: Bill Arriaga of Care,Evaluative findings,Home Exercise Program,Gait Training             Treatment may include any combination of the following: Strengthening,ROM,Gait training,Manual Therapy - Soft Tissue Mobilization,Home exercise program,Therapeutic activities,Balance training,Neuromuscular re-education,Modalities     Frequency / Duration:  Patient to be seen 1 for 2 weeks      Eval Complexity: Overall Evaluation : Low  Decision Making: Low Complexity  History: Personal Factors and/or Comorbidities Impacting POC: Low  Examination of body system(s) including body structures and functions, activity limitations, and/or participation restrictions: Low  Clinical Presentation: Low          Therapist Signature: Arnel Lechuga PT, DPT, OCS     Date: 6/1/2022 6/1/2022 20:67 AM     I certify that the above Therapy Services are being furnished while the patient is under my care.  I agree with the treatment plan and certify that this therapy is necessary. Physician's Signature:  ___________________________   Date:_______                                                                   Sebastian Casillas MD        Physician Comments: _______________________________________________    Please sign and return to [id number]  [facility]. Please fax to the location listed below.  Jennifer Nguyen for this referral!    [address] [facility] [address], [address] [address]  Dept: [phone]  Loc: [phone]

## 2022-06-01 NOTE — PLAN OF CARE
Outpatient Physical Therapy           [place]           [] Phone: [phone]   Fax: [phone]  [place] [facility]           [] Phone: [phone]   Fax: [phone]     To: Kortney Balderrama MD   From: Rhiannon Burch, PT, DPT, OCS    Patient: Juliane Jones       : 1955  Diagnosis: Unilateral primary osteoarthritis, left knee [M17.12]  Pain in left knee [M25.562]  Other chronic pain [G89.29] Diagnosis: L TKA   22  Treatment Diagnosis: L knee pain, min weakness  Date: 2022    Physical Therapy Certification/Re-Certification Form  Dear Dr. Helena Alfaro   The following patient has been evaluated for physical therapy services and for therapy to continue, insurance requires physician review of the treatment plan initially and every 90 days. Please review the attached evaluation and/or summary of the patient's plan of care, and verify that you agree therapy should continue by signing the attached document and sending it back to our office. Assessment:      Pt is 61year old male with expected L TKA on 22. Pt now has difficulties completing closed chain activities including stairs and sit to stand and prolonged weightbearing activities with increasing pain. Pt demo deficits this date that include pain, flexibility restrictions and weakness. Pt educated on proper gait with AD and stair negotiation. Issued handout for exercises prior and post surgery until return to outpatient PT services. Pt will benefit with PT services with strength/ROM, gait training, manual and modalities post surgery to maximize function. Pt prior to onset of current condition had min/no pain with able to complete full ADLs and work activities. Patient received education on their current pathology and how their condition effects them with their functional activities. Patient understood discussion and questions were answered. Patient understands their activity limitations and understands rational for treatment progression.     Plan of Care/Treatment to date:  [x] Therapeutic Exercise  [x] Modalities:  [x] Therapeutic Activity     [] Ultrasound  [x] Electrical Stimulation  [x] Gait Training      [] Cervical Traction [] Lumbar Traction  [x] Neuromuscular Re-education    [x] Cold/hotpack [] Iontophoresis   [x] Instruction in HEP      [x] Vasopneumatic    [] Dry Needling  [x] Manual Therapy               [] Aquatic Therapy       Other:          Frequency/Duration:  # Days per week: [x] 1 day # Weeks: [] 1 week [] 5 weeks     [] 2 days   [x] 2 weeks [] 6 weeks     [] 3 days   [] 3 weeks [] 7 weeks     [] 4 days   [] 4 weeks [] 8 weeks         [] 9 weeks [] 10 weeks         [] 11 weeks [] 12 weeks    Rehab Potential/Progress: [] Excellent [x] Good [] Fair  [] Poor     Goals:    Patient goals: improve pain and function. Short term goals  Time Frame for Short term goals: Defer to Long Term Goals       Short term goals (All Goals MET)   Time Frame for Long term goals : In this visit, patient will  Long term goal 1: demonstrate good understanding of exercise progression post surgery. Long term goal 2: demonstrate good understanding of walker use post surgery    Long term goal 3: demonstrate good understanding of ascending/descending stairs after surgery. Long term goal 4: watch [address] Video      Time Frame for Long term goals: Re-assess post surgery for appropriate goals. Electronically signed by:  David Kingston PT, DPT, OCS  6/1/2022, 11:47 AM    6/1/2022 11:47 AM         If you have any questions or concerns, please don't hesitate to call.   Thank you for your referral.      Physician Signature:________________________________Date:_________ TIME: _____  By signing above, therapists plan is approved by physician

## 2022-06-01 NOTE — FLOWSHEET NOTE
Outpatient Physical Therapy  [place]           [x] Phone: [phone]   Fax: [phone]  [place] [facility]           [] Phone: [phone]   Fax: [phone]        Physical Therapy Daily Treatment Note  Date:  2022    Patient Name:  Zaheer Mckinnon    :  1955  MRN: [id number]  Restrictions/Precautions: No data recorded      Diagnosis:   Unilateral primary osteoarthritis, left knee [M17.12]  Pain in left knee [M25.562]  Other chronic pain [G89.29] Diagnosis: L TKA   22  Date of Injury/Surgery:   Treatment Diagnosis:  L knee pain, min weakness  Insurance/Certification information: [facility]  Referring Physician:  Martir Desai MD    PCP: Michelle Warren PA-C  Next Doctor Visit:    Plan of care signed (Y/N):  N, sent 22   Outcome Measure: KOOS:   Visit# / total visits:   1/2 per POC  Pain level: 2/10   Goals:     Patient goals: improve pain and function. Short term goals  Time Frame for Short term goals: Defer to Long Term Goals    Short term goals (All Goals MET)   Time Frame for Long term goals : In this visit, patient will  Long term goal 1: demonstrate good understanding of exercise progression post surgery. Long term goal 2: demonstrate good understanding of walker use post surgery    Long term goal 3: demonstrate good understanding of ascending/descending stairs after surgery. Long term goal 4: watch [address] Video      Time Frame for Long term goals: Re-assess post surgery for appropriate goals. Summary of Evaluation:    Pt is 61year old male with expected L TKA on 22. Pt now has difficulties completing closed chain activities including stairs and sit to stand and prolonged weightbearing activities with increasing pain. Pt demo deficits this date that include pain, flexibility restrictions and weakness. Pt educated on proper gait with AD and stair negotiation. Issued handout for exercises prior and post surgery until return to outpatient PT services.  Pt will benefit with PT services with strength/ROM, gait training, manual and modalities post surgery to maximize function. Pt prior to onset of current condition had min/no pain with able to complete full ADLs and work activities. Patient received education on their current pathology and how their condition effects them with their functional activities. Patient understood discussion and questions were answered. Patient understands their activity limitations and understands rational for treatment progression. Subjective:  See valentín         Any changes in Ambulatory Summary Sheet? None        Objective:  See eval   COVID screening questions were asked and patient attested that there had been no contact or symptoms        Exercises: (No more than 4 columns)   Exercise/Equipment 6/1/22  #1 Date Date           WARM UP         Nu step             TABLE      Quad set 10x2  3\"     Heel prop  20\"x3     Ankle pumps  20x     Sitting AAROM knee flex stretch  10x5\"                                 STANDING      UE marches  20x                                              PROPRIOCEPTION                                    MODALITIES                      Other Therapeutic Activities/Education:  Patient received education on their current pathology and how their condition effects them with their functional activities. Patient understood discussion and questions were answered. Patient understands their activity limitations and understands rational for treatment progression. Home Exercise Program:  HO issued, reviewed and discussed with patient. Pt agreed to comply. Manual Treatments:  --      Modalities:  --      Communication with other providers:  POC sent 6/1/22        Assessment:  (Response towards treatment session) (Pain Rating)     Pt is 61year old male with expected L TKA on 6/20/22.  Pt now has difficulties completing closed chain activities including stairs and sit to stand and prolonged weightbearing activities with increasing pain. Pt demo deficits this date that include pain, flexibility restrictions and weakness. Pt educated on proper gait with AD and stair negotiation. Issued handout for exercises prior and post surgery until return to outpatient PT services. Pt will benefit with PT services with strength/ROM, gait training, manual and modalities post surgery to maximize function. Pt prior to onset of current condition had min/no pain with able to complete full ADLs and work activities. Patient received education on their current pathology and how their condition effects them with their functional activities. Patient understood discussion and questions were answered. Patient understands their activity limitations and understands rational for treatment progression. Plan for Next Session: Re-eval at next visit.         Time In / Time Out:    1133-8933        Timed Code/Total Treatment Minutes:  12/55'   12' TE, 1 PT eval      Next Progress Note due:  Eval 6/1/22   Visit 10       Plan of Care Interventions:  [x] Therapeutic Exercise  [x] Modalities:  [x] Therapeutic Activity     [] Ultrasound  [] Estim  [] Gait Training      [] Cervical Traction [] Lumbar Traction  [x] Neuromuscular Re-education    [x] Cold/hotpack [] Iontophoresis   [x] Instruction in HEP      [x] Vasopneumatic   [] Dry Needling    [x] Manual Therapy               [] Aquatic Therapy              Electronically signed by:  Marisue Denver, PT, DPT, OCS  6/1/2022, 8:07 AM    6/1/2022 8:07 AM

## 2022-06-07 ENCOUNTER — HOSPITAL ENCOUNTER (OUTPATIENT)
Dept: CT IMAGING | Age: 67
Discharge: HOME | End: 2022-06-07
Payer: COMMERCIAL

## 2022-06-07 ENCOUNTER — HOSPITAL ENCOUNTER (OUTPATIENT)
Dept: GENERAL RADIOLOGY | Age: 67
Discharge: HOME | End: 2022-06-07
Payer: COMMERCIAL

## 2022-06-07 ENCOUNTER — HOSPITAL ENCOUNTER (OUTPATIENT)
Age: 67
Discharge: HOME | End: 2022-06-07
Payer: COMMERCIAL

## 2022-06-07 ENCOUNTER — HOSPITAL ENCOUNTER (OUTPATIENT)
Dept: PREADMISSION TESTING | Age: 67
Discharge: HOME | End: 2022-06-11
Payer: COMMERCIAL

## 2022-06-07 VITALS
DIASTOLIC BLOOD PRESSURE: 108 MMHG | BODY MASS INDEX: 30.64 KG/M2 | RESPIRATION RATE: 18 BRPM | HEIGHT: 70 IN | SYSTOLIC BLOOD PRESSURE: 170 MMHG | OXYGEN SATURATION: 95 % | HEART RATE: 71 BPM | WEIGHT: 214 LBS

## 2022-06-07 DIAGNOSIS — Z01.818 ENCOUNTER FOR PREADMISSION TESTING: Primary | ICD-10-CM

## 2022-06-07 DIAGNOSIS — Z01.818 PRE-OP TESTING: ICD-10-CM

## 2022-06-07 DIAGNOSIS — J98.4 RESTRICTIVE LUNG DISEASE: ICD-10-CM

## 2022-06-07 DIAGNOSIS — Z01.818 ENCOUNTER FOR PREADMISSION TESTING: ICD-10-CM

## 2022-06-07 LAB
ALBUMIN SERPL-MCNC: 4.6 GM/DL (ref 3.4–5)
ALP BLD-CCNC: 103 IU/L (ref 40–128)
ALT SERPL-CCNC: 15 U/L (ref 10–40)
ANION GAP SERPL CALCULATED.3IONS-SCNC: 9 MMOL/L (ref 4–16)
AST SERPL-CCNC: 14 IU/L (ref 15–37)
BACTERIA: NEGATIVE /HPF
BASOPHILS ABSOLUTE: 0 K/CU MM
BASOPHILS RELATIVE PERCENT: 0.5 % (ref 0–1)
BILIRUB SERPL-MCNC: 0.7 MG/DL (ref 0–1)
BILIRUBIN URINE: NEGATIVE MG/DL
BLOOD, URINE: ABNORMAL
BUN BLDV-MCNC: 12 MG/DL (ref 6–23)
CALCIUM SERPL-MCNC: 8.9 MG/DL (ref 8.3–10.6)
CHLORIDE BLD-SCNC: 104 MMOL/L (ref 99–110)
CLARITY: CLEAR
CO2: 28 MMOL/L (ref 21–32)
COLOR: YELLOW
CREAT SERPL-MCNC: 0.9 MG/DL (ref 0.9–1.3)
DIFFERENTIAL TYPE: ABNORMAL
EKG ATRIAL RATE: 76 BPM
EKG DIAGNOSIS: NORMAL
EKG P AXIS: 47 DEGREES
EKG P-R INTERVAL: 176 MS
EKG Q-T INTERVAL: 402 MS
EKG QRS DURATION: 86 MS
EKG QTC CALCULATION (BAZETT): 452 MS
EKG R AXIS: 39 DEGREES
EKG T AXIS: 68 DEGREES
EKG VENTRICULAR RATE: 76 BPM
EOSINOPHILS ABSOLUTE: 0.1 K/CU MM
EOSINOPHILS RELATIVE PERCENT: 2.1 % (ref 0–3)
ERYTHROCYTE SEDIMENTATION RATE: 9 MM/HR (ref 0–20)
GFR AFRICAN AMERICAN: >60 ML/MIN/1.73M2
GFR NON-AFRICAN AMERICAN: >60 ML/MIN/1.73M2
GLUCOSE BLD-MCNC: 123 MG/DL (ref 70–99)
GLUCOSE, URINE: NEGATIVE MG/DL
HCT VFR BLD CALC: 43.4 % (ref 42–52)
HEMOGLOBIN: 14.2 GM/DL (ref 13.5–18)
IMMATURE NEUTROPHIL %: 0.3 % (ref 0–0.43)
KETONES, URINE: NEGATIVE MG/DL
LEUKOCYTE ESTERASE, URINE: NEGATIVE
LYMPHOCYTES ABSOLUTE: 1.4 K/CU MM
LYMPHOCYTES RELATIVE PERCENT: 23.5 % (ref 24–44)
MCH RBC QN AUTO: 31.1 PG (ref 27–31)
MCHC RBC AUTO-ENTMCNC: 32.7 % (ref 32–36)
MCV RBC AUTO: 95.2 FL (ref 78–100)
MONOCYTES ABSOLUTE: 0.4 K/CU MM
MONOCYTES RELATIVE PERCENT: 7.1 % (ref 0–4)
MUCUS: ABNORMAL HPF
NITRITE URINE, QUANTITATIVE: NEGATIVE
NUCLEATED RBC %: 0 %
PDW BLD-RTO: 12 % (ref 11.7–14.9)
PH, URINE: 5.5 (ref 5–8)
PLATELET # BLD: 233 K/CU MM (ref 140–440)
PMV BLD AUTO: 9.7 FL (ref 7.5–11.1)
POTASSIUM SERPL-SCNC: 4.3 MMOL/L (ref 3.5–5.1)
PROTEIN UA: NEGATIVE MG/DL
RBC # BLD: 4.56 M/CU MM (ref 4.6–6.2)
RBC URINE: 1 /HPF (ref 0–3)
SEGMENTED NEUTROPHILS ABSOLUTE COUNT: 3.8 K/CU MM
SEGMENTED NEUTROPHILS RELATIVE PERCENT: 66.5 % (ref 36–66)
SODIUM BLD-SCNC: 141 MMOL/L (ref 135–145)
SPECIFIC GRAVITY UA: 1.02 (ref 1–1.03)
TOTAL IMMATURE NEUTOROPHIL: 0.02 K/CU MM
TOTAL NUCLEATED RBC: 0 K/CU MM
TOTAL PROTEIN: 6.8 GM/DL (ref 6.4–8.2)
TRICHOMONAS: ABNORMAL /HPF
UROBILINOGEN, URINE: 0.2 MG/DL (ref 0.2–1)
WBC # BLD: 5.7 K/CU MM (ref 4–10.5)
WBC UA: <1 /HPF (ref 0–2)

## 2022-06-07 PROCEDURE — 93010 ELECTROCARDIOGRAM REPORT: CPT | Performed by: INTERNAL MEDICINE

## 2022-06-07 PROCEDURE — 81001 URINALYSIS AUTO W/SCOPE: CPT

## 2022-06-07 PROCEDURE — 80053 COMPREHEN METABOLIC PANEL: CPT

## 2022-06-07 PROCEDURE — 87086 URINE CULTURE/COLONY COUNT: CPT

## 2022-06-07 PROCEDURE — 71046 X-RAY EXAM CHEST 2 VIEWS: CPT

## 2022-06-07 PROCEDURE — 85652 RBC SED RATE AUTOMATED: CPT

## 2022-06-07 PROCEDURE — 93005 ELECTROCARDIOGRAM TRACING: CPT | Performed by: ORTHOPAEDIC SURGERY

## 2022-06-07 PROCEDURE — 36415 COLL VENOUS BLD VENIPUNCTURE: CPT

## 2022-06-07 PROCEDURE — 85025 COMPLETE CBC W/AUTO DIFF WBC: CPT

## 2022-06-07 ASSESSMENT — PAIN DESCRIPTION - PAIN TYPE: TYPE: CHRONIC PAIN

## 2022-06-07 ASSESSMENT — PAIN DESCRIPTION - FREQUENCY: FREQUENCY: CONTINUOUS

## 2022-06-07 ASSESSMENT — PAIN DESCRIPTION - DESCRIPTORS: DESCRIPTORS: ACHING;DISCOMFORT;SHARP;SHOOTING;SORE

## 2022-06-07 ASSESSMENT — PAIN DESCRIPTION - LOCATION: LOCATION: KNEE

## 2022-06-07 ASSESSMENT — PAIN DESCRIPTION - ONSET: ONSET: PROGRESSIVE

## 2022-06-07 ASSESSMENT — PAIN - FUNCTIONAL ASSESSMENT: PAIN_FUNCTIONAL_ASSESSMENT: PREVENTS OR INTERFERES SOME ACTIVE ACTIVITIES AND ADLS

## 2022-06-07 ASSESSMENT — PAIN SCALES - GENERAL: PAINLEVEL_OUTOF10: 4

## 2022-06-07 NOTE — PROGRESS NOTES
I called a reminder to patient confirming his PAT, pre-op teting, and jointclas is on 6/7/22 at 1000. I ask if he could arrive at 0900 and he agreed.
I saw patient in person today for PAT, pre-op testing, and ortho class for up coming surgery on 22. Patient has a cardiac history and on Plavix and aspirin. I do not see a cardiac clearance in Media or a note from Dr. Yi Wolfe office. I sent Zandra Rangel Jeremy at Dr. Axel Trujillo office a TEAMS message informing her and asking for cardiac clearance as follows:    TO Zandra Rangel Jeremy  [98:[phone] [facility], [address] [facility]! I just did the in-person PAT for Daron Beckham  59 (Dr. Zuhair He) left total knee for surgery on 22. Patient has a significant heart history and saw Dr. Jam Pool at [facility] a couple weeks ago (per patient). There isn't any cardiac clearance in. Patient is on aspirin and plavix and he said cardio told him to hold it 7 days prior, but we need the clearance sheet scanned in media please. 22 1202 I did not hear back from Zandra Rangel so I called Dr. Axel Trujillo office and left a message on the surgery schedulers voicemail stating the above and ask for a return call.
Please bring picture ID,  insurance card, paperwork from the doctors office    (H & P, Consent, & card for implantable devices). 12. Take a shower the night before or morning of your procedure, do not apply any lotion, oil or powder. It is recommended to use Hibiclens or CHG wash during pre-op shower/bath.              13. Wear a mask covering your nose & mouth when entering the hospital.

## 2022-06-08 ENCOUNTER — TELEPHONE (OUTPATIENT)
Dept: CARDIOLOGY CLINIC | Age: 67
End: 2022-06-08

## 2022-06-08 LAB
CULTURE: NORMAL
Lab: NORMAL
SPECIMEN: NORMAL

## 2022-06-08 NOTE — TELEPHONE ENCOUNTER
Called the pt, we got a cardiac clearance from Dr Ger Art- he's never been seen here in the office- I called the pt, left a v.m. to see if another cardiologist follows his care. The patient called back and [facility]. I called Dr Ger Art office to let them know- I left a msg with the surg.  .

## 2022-06-13 ENCOUNTER — TELEPHONE (OUTPATIENT)
Dept: ORTHOPEDIC SURGERY | Age: 67
End: 2022-06-13

## 2022-06-13 NOTE — TELEPHONE ENCOUNTER
Scheduled patient in office for LT TKA on 6/20/22 at [address] [place] [address]. Patient voiced understanding of date/time and instructions. Procedure request along with pathway orders faxed. PCP, Cardiac, Pulmonology Clearance request sent. Physical Therapy and CT orders created. Patient has Medicare and Medicaid as of 6/1/22 so no prior Douglas Dillard is required.

## 2022-06-14 ENCOUNTER — OFFICE VISIT (OUTPATIENT)
Dept: ORTHOPEDIC SURGERY | Age: 67
End: 2022-06-14

## 2022-06-14 ENCOUNTER — TELEPHONE (OUTPATIENT)
Dept: ORTHOPEDIC SURGERY | Age: 67
End: 2022-06-14

## 2022-06-14 VITALS
BODY MASS INDEX: 30.64 KG/M2 | RESPIRATION RATE: 19 BRPM | WEIGHT: 214 LBS | OXYGEN SATURATION: 93 % | DIASTOLIC BLOOD PRESSURE: 88 MMHG | SYSTOLIC BLOOD PRESSURE: 143 MMHG | HEIGHT: 70 IN | HEART RATE: 82 BPM

## 2022-06-14 DIAGNOSIS — S82.51XA CLOSED AVULSION FRACTURE OF MEDIAL MALLEOLUS OF RIGHT TIBIA, INITIAL ENCOUNTER: Primary | ICD-10-CM

## 2022-06-14 DIAGNOSIS — S92.154A CLOSED NONDISPLACED AVULSION FRACTURE OF RIGHT TALUS, INITIAL ENCOUNTER: ICD-10-CM

## 2022-06-14 PROCEDURE — 99214 OFFICE O/P EST MOD 30 MIN: CPT | Performed by: ORTHOPAEDIC SURGERY

## 2022-06-14 PROCEDURE — 27760 CLTX MEDIAL ANKLE FX: CPT | Performed by: ORTHOPAEDIC SURGERY

## 2022-06-14 RX ORDER — LOSARTAN POTASSIUM 100 MG/1
TABLET ORAL
COMMUNITY
Start: 2022-06-11

## 2022-06-14 RX ORDER — NEOMYCIN SULFATE, POLYMYXIN B SULFATE AND DEXAMETHASONE 3.5; 10000; 1 MG/ML; [USP'U]/ML; MG/ML
SUSPENSION/ DROPS OPHTHALMIC
COMMUNITY
Start: 2022-05-26

## 2022-06-14 NOTE — PATIENT INSTRUCTIONS
May begin weightbearing as tolerate. Continue wearing the boot. Continue range of motion exercises as instructed. Ice and elevate as needed. Tylenol or Motrin for pain. Follow up in 2 weeks for repeat x-rays.

## 2022-06-14 NOTE — TELEPHONE ENCOUNTER
At the request of Dr. Meli Hensley, we are cancelling Jose's surgery for LT TKA that was scheduled on 6/20/22, due to he now has a broken ankle. Ainsley Flores has been notified via teams. All post op appointments have been cancelled.
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
## 2022-06-18 ASSESSMENT — ENCOUNTER SYMPTOMS
COLOR CHANGE: 0
WHEEZING: 0
CHEST TIGHTNESS: 0
VOMITING: 0
EYE REDNESS: 0
EYE PAIN: 0
SHORTNESS OF BREATH: 0

## 2023-02-15 ENCOUNTER — HOSPITAL ENCOUNTER (OUTPATIENT)
Dept: ULTRASOUND IMAGING | Age: 68
Discharge: HOME | End: 2023-02-15
Payer: MEDICARE

## 2023-02-15 DIAGNOSIS — R11.0 NAUSEA: ICD-10-CM

## 2023-02-15 DIAGNOSIS — K74.60 HEPATIC CIRRHOSIS, UNSPECIFIED HEPATIC CIRRHOSIS TYPE, UNSPECIFIED WHETHER ASCITES PRESENT (HCC): ICD-10-CM

## 2023-02-15 PROCEDURE — 76705 ECHO EXAM OF ABDOMEN: CPT

## 2023-03-08 ENCOUNTER — HOSPITAL ENCOUNTER (OUTPATIENT)
Dept: NUCLEAR MEDICINE | Age: 68
Discharge: HOME | End: 2023-03-08
Payer: MEDICARE

## 2023-03-08 VITALS — HEIGHT: 70 IN | BODY MASS INDEX: 30.78 KG/M2 | WEIGHT: 215 LBS

## 2023-03-08 DIAGNOSIS — K82.8 THICKENING OF WALL OF GALLBLADDER: ICD-10-CM

## 2023-03-08 PROCEDURE — A9537 TC99M MEBROFENIN: HCPCS

## 2023-03-08 PROCEDURE — 3430000000 HC RX DIAGNOSTIC RADIOPHARMACEUTICAL

## 2023-03-08 PROCEDURE — 78227 HEPATOBIL SYST IMAGE W/DRUG: CPT

## 2023-03-08 RX ADMIN — Medication 5 MILLICURIE: at 08:55

## 2023-07-12 ENCOUNTER — APPOINTMENT (OUTPATIENT)
Dept: GENERAL RADIOLOGY | Age: 68
End: 2023-07-12
Payer: MEDICARE

## 2023-07-12 ENCOUNTER — HOSPITAL ENCOUNTER (EMERGENCY)
Age: 68
Discharge: HOME | End: 2023-07-12
Attending: EMERGENCY MEDICINE
Payer: MEDICARE

## 2023-07-12 VITALS
HEART RATE: 83 BPM | HEIGHT: 70 IN | RESPIRATION RATE: 18 BRPM | TEMPERATURE: 97.7 F | DIASTOLIC BLOOD PRESSURE: 65 MMHG | OXYGEN SATURATION: 95 % | WEIGHT: 212 LBS | SYSTOLIC BLOOD PRESSURE: 92 MMHG | BODY MASS INDEX: 30.35 KG/M2

## 2023-07-12 DIAGNOSIS — S82.65XA CLOSED NONDISPLACED FRACTURE OF LATERAL MALLEOLUS OF LEFT FIBULA, INITIAL ENCOUNTER: Primary | ICD-10-CM

## 2023-07-12 PROCEDURE — 99283 EMERGENCY DEPT VISIT LOW MDM: CPT

## 2023-07-12 PROCEDURE — 73562 X-RAY EXAM OF KNEE 3: CPT

## 2023-07-12 PROCEDURE — 6370000000 HC RX 637 (ALT 250 FOR IP): Performed by: EMERGENCY MEDICINE

## 2023-07-12 PROCEDURE — 73610 X-RAY EXAM OF ANKLE: CPT

## 2023-07-12 PROCEDURE — 73630 X-RAY EXAM OF FOOT: CPT

## 2023-07-12 RX ORDER — ONDANSETRON 4 MG/1
4 TABLET, ORALLY DISINTEGRATING ORAL EVERY 8 HOURS PRN
Qty: 10 TABLET | Refills: 0 | Status: SHIPPED | OUTPATIENT
Start: 2023-07-12

## 2023-07-12 RX ORDER — OXYCODONE HYDROCHLORIDE AND ACETAMINOPHEN 5; 325 MG/1; MG/1
1 TABLET ORAL EVERY 4 HOURS PRN
Qty: 15 TABLET | Refills: 0 | Status: SHIPPED | OUTPATIENT
Start: 2023-07-12 | End: 2023-07-15

## 2023-07-12 RX ORDER — OXYCODONE HYDROCHLORIDE AND ACETAMINOPHEN 5; 325 MG/1; MG/1
2 TABLET ORAL ONCE
Status: COMPLETED | OUTPATIENT
Start: 2023-07-12 | End: 2023-07-12

## 2023-07-12 RX ORDER — ONDANSETRON 4 MG/1
4 TABLET, ORALLY DISINTEGRATING ORAL ONCE
Status: COMPLETED | OUTPATIENT
Start: 2023-07-12 | End: 2023-07-12

## 2023-07-12 RX ADMIN — ONDANSETRON 4 MG: 4 TABLET, ORALLY DISINTEGRATING ORAL at 09:26

## 2023-07-12 RX ADMIN — OXYCODONE HYDROCHLORIDE AND ACETAMINOPHEN 2 TABLET: 5; 325 TABLET ORAL at 09:26

## 2023-07-12 ASSESSMENT — PAIN SCALES - GENERAL
PAINLEVEL_OUTOF10: 10
PAINLEVEL_OUTOF10: 10

## 2023-07-12 ASSESSMENT — PAIN DESCRIPTION - LOCATION: LOCATION: ANKLE

## 2023-07-12 ASSESSMENT — PAIN DESCRIPTION - ORIENTATION: ORIENTATION: RIGHT

## 2023-07-12 ASSESSMENT — PAIN - FUNCTIONAL ASSESSMENT: PAIN_FUNCTIONAL_ASSESSMENT: 0-10
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
## 2023-07-12 NOTE — ED NOTES
Pt coming in with crutches. Denies need for WC to be discharged. Wife walking out with pt.       Grayson Cloud RN  07/12/23 0071

## 2023-10-06 ENCOUNTER — HOSPITAL ENCOUNTER (OUTPATIENT)
Age: 68
Discharge: HOME | End: 2023-10-06
Payer: MEDICARE

## 2023-10-06 ENCOUNTER — HOSPITAL ENCOUNTER (OUTPATIENT)
Dept: GENERAL RADIOLOGY | Age: 68
Discharge: HOME | End: 2023-10-06
Payer: MEDICARE

## 2023-10-06 DIAGNOSIS — S82.64XD CLOSED NONDISPLACED FRACTURE OF LATERAL MALLEOLUS OF RIGHT FIBULA WITH ROUTINE HEALING, SUBSEQUENT ENCOUNTER: ICD-10-CM

## 2023-10-06 PROCEDURE — 73610 X-RAY EXAM OF ANKLE: CPT

## 2024-03-03 ENCOUNTER — HOSPITAL ENCOUNTER (EMERGENCY)
Age: 69
Discharge: HOME | End: 2024-03-03
Payer: MEDICARE

## 2024-03-03 ENCOUNTER — APPOINTMENT (OUTPATIENT)
Dept: GENERAL RADIOLOGY | Age: 69
End: 2024-03-03
Payer: MEDICARE

## 2024-03-03 VITALS
RESPIRATION RATE: 16 BRPM | HEIGHT: 70 IN | BODY MASS INDEX: 35.79 KG/M2 | DIASTOLIC BLOOD PRESSURE: 94 MMHG | WEIGHT: 250 LBS | SYSTOLIC BLOOD PRESSURE: 139 MMHG | HEART RATE: 72 BPM | OXYGEN SATURATION: 97 % | TEMPERATURE: 98.1 F

## 2024-03-03 DIAGNOSIS — R05.9 COUGH, UNSPECIFIED TYPE: ICD-10-CM

## 2024-03-03 DIAGNOSIS — J44.9 CHRONIC OBSTRUCTIVE PULMONARY DISEASE, UNSPECIFIED COPD TYPE (HCC): Primary | ICD-10-CM

## 2024-03-03 PROCEDURE — 6370000000 HC RX 637 (ALT 250 FOR IP): Performed by: PHYSICIAN ASSISTANT

## 2024-03-03 PROCEDURE — 94640 AIRWAY INHALATION TREATMENT: CPT

## 2024-03-03 PROCEDURE — 71045 X-RAY EXAM CHEST 1 VIEW: CPT

## 2024-03-03 PROCEDURE — 99283 EMERGENCY DEPT VISIT LOW MDM: CPT

## 2024-03-03 RX ORDER — AZITHROMYCIN 250 MG/1
TABLET, FILM COATED ORAL
Qty: 6 TABLET | Refills: 0 | Status: SHIPPED | OUTPATIENT
Start: 2024-03-03 | End: 2024-03-13

## 2024-03-03 RX ORDER — PROMETHAZINE HYDROCHLORIDE, PHENYLEPHRINE HYDROCHLORIDE AND CODEINE PHOSPHATE 6.25; 5; 1 MG/5ML; MG/5ML; MG/5ML
5 SOLUTION ORAL EVERY 4 HOURS PRN
Qty: 118 ML | Refills: 0 | Status: SHIPPED | OUTPATIENT
Start: 2024-03-03 | End: 2024-03-10

## 2024-03-03 RX ORDER — IPRATROPIUM BROMIDE AND ALBUTEROL SULFATE 2.5; .5 MG/3ML; MG/3ML
1 SOLUTION RESPIRATORY (INHALATION) ONCE
Status: COMPLETED | OUTPATIENT
Start: 2024-03-03 | End: 2024-03-03

## 2024-03-03 RX ORDER — PREDNISONE 20 MG/1
60 TABLET ORAL ONCE
Status: COMPLETED | OUTPATIENT
Start: 2024-03-03 | End: 2024-03-03

## 2024-03-03 RX ORDER — PREDNISONE 50 MG/1
50 TABLET ORAL DAILY
Qty: 5 TABLET | Refills: 0 | Status: SHIPPED | OUTPATIENT
Start: 2024-03-03 | End: 2024-03-08

## 2024-03-03 RX ORDER — GUAIFENESIN/DEXTROMETHORPHAN 100-10MG/5
5 SYRUP ORAL ONCE
Status: COMPLETED | OUTPATIENT
Start: 2024-03-03 | End: 2024-03-03

## 2024-03-03 RX ADMIN — GUAIFENESIN AND DEXTROMETHORPHAN 5 ML: 100; 10 SYRUP ORAL at 20:26

## 2024-03-03 RX ADMIN — IPRATROPIUM BROMIDE AND ALBUTEROL SULFATE 1 DOSE: 2.5; .5 SOLUTION RESPIRATORY (INHALATION) at 20:39

## 2024-03-03 RX ADMIN — PREDNISONE 60 MG: 20 TABLET ORAL at 20:25

## 2024-03-03 ASSESSMENT — PAIN SCALES - GENERAL
PAINLEVEL_OUTOF10: 6
PAINLEVEL_OUTOF10: 8

## 2024-03-03 ASSESSMENT — PAIN DESCRIPTION - LOCATION
LOCATION: HEAD
LOCATION: HEAD

## 2024-03-03 ASSESSMENT — PAIN - FUNCTIONAL ASSESSMENT: PAIN_FUNCTIONAL_ASSESSMENT: 0-10

## 2024-03-04 NOTE — ED PROVIDER NOTES
(ROBITUSSIN DM) 100-10 MG/5ML syrup 5 mL (5 mLs Oral Given 3/3/24 2026)         Is this patient to be included in the SEP-1 Core Measure due to severe sepsis or septic shock?   No   Exclusion criteria - the patient is NOT to be included for SEP-1 Core Measure due to:  2+ SIRS criteria are not met    MDM:    CC/HPI Summary, DDx, ED Course, and Reassessment:     Patient presents as above.  Emergent etiologies considered.  Patient seen and examined.  Work-up initiated secondary to presentation, physical exam findings, vital signs and medical chart review.    In brief, 68-year-old male presenting to the ED via private vehicle with worsening cough congestion, coughing spells, posttussive emesis, intermittent shortness of breath.  Has been worsening over the last several days.  Has history of COPD, emphysema history of smoking.  Does not continue to smoke.  No recent sick contacts, no fevers or chills no nasal congestion no significant sore throat.  States he does have these worsening spells that then lead to episodes of vomiting.    On exam though he overall appears well, he has no active fever pulse in the 60s respirations are 18, he is 94-95 on room air, no signs of respiratory distress.  ENT exam is unremarkable, heart lung sounds demonstrate no significant adventitious sounds, he is mildly wheezy, rhonchorous especially when he is coughing.    Will obtain chest x-ray, provide breathing treatment, give oral steroid, cough medication.    Chest x-ray demonstrated no signs of acute cardiopulmonary process.  No underlying infiltrate.  Patient's symptoms are likely related to his COPD.  Will place on on a burst of steroid, will give prescription strength cough medication, will also place on empiric antibiotics for any atypical infection, Z-Keenan.  Will advise to use rescue inhaler as needed.  Will encourage follow-up with primary care as we discussed.  Will discharge home in stable condition.    History from : Patient and

## 2024-04-11 ENCOUNTER — HOSPITAL ENCOUNTER (OUTPATIENT)
Dept: CT IMAGING | Age: 69
Discharge: HOME | End: 2024-04-11
Payer: MEDICARE

## 2024-04-11 DIAGNOSIS — F17.211 CIGARETTE NICOTINE DEPENDENCE IN REMISSION: ICD-10-CM

## 2024-04-11 DIAGNOSIS — Z87.891 PERSONAL HISTORY OF TOBACCO USE, PRESENTING HAZARDS TO HEALTH: ICD-10-CM

## 2024-04-11 PROCEDURE — 71271 CT THORAX LUNG CANCER SCR C-: CPT

## 2024-04-21 ENCOUNTER — HOSPITAL ENCOUNTER (INPATIENT)
Age: 69
LOS: 1 days | Discharge: HOME | DRG: 313 | End: 2024-04-22
Attending: EMERGENCY MEDICINE | Admitting: STUDENT IN AN ORGANIZED HEALTH CARE EDUCATION/TRAINING PROGRAM
Payer: MEDICARE

## 2024-04-21 ENCOUNTER — APPOINTMENT (OUTPATIENT)
Dept: GENERAL RADIOLOGY | Age: 69
DRG: 313 | End: 2024-04-21
Payer: MEDICARE

## 2024-04-21 DIAGNOSIS — R79.89 TROPONIN I ABOVE REFERENCE RANGE: ICD-10-CM

## 2024-04-21 DIAGNOSIS — R07.2 PRECORDIAL PAIN: ICD-10-CM

## 2024-04-21 DIAGNOSIS — R07.9 CHEST PAIN, UNSPECIFIED TYPE: Primary | ICD-10-CM

## 2024-04-21 LAB
ALBUMIN SERPL-MCNC: 4.3 GM/DL (ref 3.4–5)
ALP BLD-CCNC: 104 IU/L (ref 40–128)
ALT SERPL-CCNC: 16 U/L (ref 10–40)
ANION GAP SERPL CALCULATED.3IONS-SCNC: 10 MMOL/L (ref 7–16)
AST SERPL-CCNC: 14 IU/L (ref 15–37)
BASOPHILS ABSOLUTE: 0 K/CU MM
BASOPHILS RELATIVE PERCENT: 0.5 % (ref 0–1)
BILIRUB SERPL-MCNC: 0.6 MG/DL (ref 0–1)
BUN SERPL-MCNC: 11 MG/DL (ref 6–23)
CALCIUM SERPL-MCNC: 8.8 MG/DL (ref 8.3–10.6)
CHLORIDE BLD-SCNC: 100 MMOL/L (ref 99–110)
CO2: 27 MMOL/L (ref 21–32)
CREAT SERPL-MCNC: 1 MG/DL (ref 0.9–1.3)
DIFFERENTIAL TYPE: ABNORMAL
EOSINOPHILS ABSOLUTE: 0 K/CU MM
EOSINOPHILS RELATIVE PERCENT: 0.7 % (ref 0–3)
GFR SERPL CREATININE-BSD FRML MDRD: 83 ML/MIN/1.73M2
GLUCOSE BLD-MCNC: 186 MG/DL (ref 70–99)
GLUCOSE BLD-MCNC: 217 MG/DL (ref 70–99)
GLUCOSE SERPL-MCNC: 217 MG/DL (ref 70–99)
HCT VFR BLD CALC: 41.4 % (ref 42–52)
HEMOGLOBIN: 13.5 GM/DL (ref 13.5–18)
IMMATURE NEUTROPHIL %: 0.2 % (ref 0–0.43)
LYMPHOCYTES ABSOLUTE: 1.8 K/CU MM
LYMPHOCYTES RELATIVE PERCENT: 31.3 % (ref 24–44)
MCH RBC QN AUTO: 30.8 PG (ref 27–31)
MCHC RBC AUTO-ENTMCNC: 32.6 % (ref 32–36)
MCV RBC AUTO: 94.5 FL (ref 78–100)
MONOCYTES ABSOLUTE: 0.4 K/CU MM
MONOCYTES RELATIVE PERCENT: 6.3 % (ref 0–4)
NEUTROPHILS RELATIVE PERCENT: 61 % (ref 36–66)
NUCLEATED RBC %: 0 %
PDW BLD-RTO: 12.5 % (ref 11.7–14.9)
PLATELET # BLD: 193 K/CU MM (ref 140–440)
PMV BLD AUTO: 10.1 FL (ref 7.5–11.1)
POTASSIUM SERPL-SCNC: 4.3 MMOL/L (ref 3.5–5.1)
RBC # BLD: 4.38 M/CU MM (ref 4.6–6.2)
SEGMENTED NEUTROPHILS ABSOLUTE COUNT: 3.5 K/CU MM
SODIUM BLD-SCNC: 137 MMOL/L (ref 135–145)
TOTAL IMMATURE NEUTOROPHIL: 0.01 K/CU MM
TOTAL NUCLEATED RBC: 0 K/CU MM
TOTAL PROTEIN: 7.1 GM/DL (ref 6.4–8.2)
TROPONIN, HIGH SENSITIVITY: 24 NG/L (ref 0–22)
TROPONIN, HIGH SENSITIVITY: 25 NG/L (ref 0–22)
TROPONIN, HIGH SENSITIVITY: 27 NG/L (ref 0–22)
TROPONIN, HIGH SENSITIVITY: 28 NG/L (ref 0–22)
WBC # BLD: 5.7 K/CU MM (ref 4–10.5)

## 2024-04-21 PROCEDURE — 6370000000 HC RX 637 (ALT 250 FOR IP): Performed by: STUDENT IN AN ORGANIZED HEALTH CARE EDUCATION/TRAINING PROGRAM

## 2024-04-21 PROCEDURE — 6370000000 HC RX 637 (ALT 250 FOR IP): Performed by: EMERGENCY MEDICINE

## 2024-04-21 PROCEDURE — 2580000003 HC RX 258: Performed by: STUDENT IN AN ORGANIZED HEALTH CARE EDUCATION/TRAINING PROGRAM

## 2024-04-21 PROCEDURE — 99285 EMERGENCY DEPT VISIT HI MDM: CPT

## 2024-04-21 PROCEDURE — 93005 ELECTROCARDIOGRAM TRACING: CPT | Performed by: EMERGENCY MEDICINE

## 2024-04-21 PROCEDURE — 36415 COLL VENOUS BLD VENIPUNCTURE: CPT

## 2024-04-21 PROCEDURE — 71045 X-RAY EXAM CHEST 1 VIEW: CPT

## 2024-04-21 PROCEDURE — 80053 COMPREHEN METABOLIC PANEL: CPT

## 2024-04-21 PROCEDURE — 85025 COMPLETE CBC W/AUTO DIFF WBC: CPT

## 2024-04-21 PROCEDURE — 94761 N-INVAS EAR/PLS OXIMETRY MLT: CPT

## 2024-04-21 PROCEDURE — 1200000000 HC SEMI PRIVATE

## 2024-04-21 PROCEDURE — 84484 ASSAY OF TROPONIN QUANT: CPT

## 2024-04-21 PROCEDURE — 6360000002 HC RX W HCPCS: Performed by: STUDENT IN AN ORGANIZED HEALTH CARE EDUCATION/TRAINING PROGRAM

## 2024-04-21 PROCEDURE — 82962 GLUCOSE BLOOD TEST: CPT

## 2024-04-21 RX ORDER — GLUCAGON 1 MG/ML
1 KIT INJECTION PRN
Status: DISCONTINUED | OUTPATIENT
Start: 2024-04-21 | End: 2024-04-22 | Stop reason: HOSPADM

## 2024-04-21 RX ORDER — ONDANSETRON 2 MG/ML
4 INJECTION INTRAMUSCULAR; INTRAVENOUS EVERY 6 HOURS PRN
Status: DISCONTINUED | OUTPATIENT
Start: 2024-04-21 | End: 2024-04-22 | Stop reason: HOSPADM

## 2024-04-21 RX ORDER — ACETAMINOPHEN 650 MG/1
650 SUPPOSITORY RECTAL EVERY 6 HOURS PRN
Status: DISCONTINUED | OUTPATIENT
Start: 2024-04-21 | End: 2024-04-22 | Stop reason: HOSPADM

## 2024-04-21 RX ORDER — INSULIN LISPRO 100 [IU]/ML
0-4 INJECTION, SOLUTION INTRAVENOUS; SUBCUTANEOUS NIGHTLY
Status: DISCONTINUED | OUTPATIENT
Start: 2024-04-21 | End: 2024-04-22 | Stop reason: HOSPADM

## 2024-04-21 RX ORDER — INSULIN LISPRO 100 [IU]/ML
0-8 INJECTION, SOLUTION INTRAVENOUS; SUBCUTANEOUS
Status: DISCONTINUED | OUTPATIENT
Start: 2024-04-21 | End: 2024-04-22 | Stop reason: HOSPADM

## 2024-04-21 RX ORDER — NITROGLYCERIN 0.4 MG/1
0.4 TABLET SUBLINGUAL ONCE
Status: COMPLETED | OUTPATIENT
Start: 2024-04-21 | End: 2024-04-21

## 2024-04-21 RX ORDER — ATORVASTATIN CALCIUM 40 MG/1
40 TABLET, FILM COATED ORAL NIGHTLY
Status: DISCONTINUED | OUTPATIENT
Start: 2024-04-21 | End: 2024-04-22 | Stop reason: HOSPADM

## 2024-04-21 RX ORDER — SODIUM CHLORIDE 0.9 % (FLUSH) 0.9 %
5-40 SYRINGE (ML) INJECTION PRN
Status: DISCONTINUED | OUTPATIENT
Start: 2024-04-21 | End: 2024-04-22 | Stop reason: HOSPADM

## 2024-04-21 RX ORDER — NITROGLYCERIN 0.4 MG/1
0.4 TABLET SUBLINGUAL EVERY 5 MIN PRN
Status: DISCONTINUED | OUTPATIENT
Start: 2024-04-21 | End: 2024-04-22 | Stop reason: HOSPADM

## 2024-04-21 RX ORDER — SODIUM CHLORIDE 0.9 % (FLUSH) 0.9 %
5-40 SYRINGE (ML) INJECTION EVERY 12 HOURS SCHEDULED
Status: DISCONTINUED | OUTPATIENT
Start: 2024-04-21 | End: 2024-04-22 | Stop reason: HOSPADM

## 2024-04-21 RX ORDER — PANTOPRAZOLE SODIUM 20 MG/1
20 TABLET, DELAYED RELEASE ORAL
Status: DISCONTINUED | OUTPATIENT
Start: 2024-04-21 | End: 2024-04-22 | Stop reason: HOSPADM

## 2024-04-21 RX ORDER — ROPINIROLE 1 MG/1
2 TABLET, FILM COATED ORAL 2 TIMES DAILY
Status: DISCONTINUED | OUTPATIENT
Start: 2024-04-21 | End: 2024-04-22 | Stop reason: HOSPADM

## 2024-04-21 RX ORDER — POTASSIUM CHLORIDE 7.45 MG/ML
10 INJECTION INTRAVENOUS PRN
Status: DISCONTINUED | OUTPATIENT
Start: 2024-04-21 | End: 2024-04-22 | Stop reason: HOSPADM

## 2024-04-21 RX ORDER — POLYETHYLENE GLYCOL 3350 17 G/17G
17 POWDER, FOR SOLUTION ORAL DAILY PRN
Status: DISCONTINUED | OUTPATIENT
Start: 2024-04-21 | End: 2024-04-22 | Stop reason: HOSPADM

## 2024-04-21 RX ORDER — MAGNESIUM SULFATE IN WATER 40 MG/ML
2000 INJECTION, SOLUTION INTRAVENOUS PRN
Status: DISCONTINUED | OUTPATIENT
Start: 2024-04-21 | End: 2024-04-22 | Stop reason: HOSPADM

## 2024-04-21 RX ORDER — ENOXAPARIN SODIUM 100 MG/ML
40 INJECTION SUBCUTANEOUS DAILY
Status: DISCONTINUED | OUTPATIENT
Start: 2024-04-21 | End: 2024-04-22 | Stop reason: HOSPADM

## 2024-04-21 RX ORDER — ASPIRIN 81 MG/1
243 TABLET, CHEWABLE ORAL ONCE
Status: COMPLETED | OUTPATIENT
Start: 2024-04-21 | End: 2024-04-21

## 2024-04-21 RX ORDER — ONDANSETRON 4 MG/1
4 TABLET, ORALLY DISINTEGRATING ORAL EVERY 8 HOURS PRN
Status: DISCONTINUED | OUTPATIENT
Start: 2024-04-21 | End: 2024-04-22 | Stop reason: HOSPADM

## 2024-04-21 RX ORDER — DEXTROSE MONOHYDRATE 100 MG/ML
INJECTION, SOLUTION INTRAVENOUS CONTINUOUS PRN
Status: DISCONTINUED | OUTPATIENT
Start: 2024-04-21 | End: 2024-04-22 | Stop reason: HOSPADM

## 2024-04-21 RX ORDER — LOSARTAN POTASSIUM 100 MG/1
100 TABLET ORAL DAILY
Status: DISCONTINUED | OUTPATIENT
Start: 2024-04-22 | End: 2024-04-22 | Stop reason: HOSPADM

## 2024-04-21 RX ORDER — ALBUTEROL SULFATE 90 UG/1
2 AEROSOL, METERED RESPIRATORY (INHALATION) EVERY 6 HOURS PRN
Status: DISCONTINUED | OUTPATIENT
Start: 2024-04-21 | End: 2024-04-22 | Stop reason: HOSPADM

## 2024-04-21 RX ORDER — CLOPIDOGREL BISULFATE 75 MG/1
75 TABLET ORAL DAILY
Status: DISCONTINUED | OUTPATIENT
Start: 2024-04-22 | End: 2024-04-22 | Stop reason: HOSPADM

## 2024-04-21 RX ORDER — ASPIRIN 81 MG/1
81 TABLET, CHEWABLE ORAL DAILY
Status: DISCONTINUED | OUTPATIENT
Start: 2024-04-22 | End: 2024-04-22 | Stop reason: HOSPADM

## 2024-04-21 RX ORDER — POTASSIUM CHLORIDE 20 MEQ/1
40 TABLET, EXTENDED RELEASE ORAL PRN
Status: DISCONTINUED | OUTPATIENT
Start: 2024-04-21 | End: 2024-04-22 | Stop reason: HOSPADM

## 2024-04-21 RX ORDER — ACETAMINOPHEN 325 MG/1
650 TABLET ORAL EVERY 6 HOURS PRN
Status: DISCONTINUED | OUTPATIENT
Start: 2024-04-21 | End: 2024-04-22 | Stop reason: HOSPADM

## 2024-04-21 RX ORDER — SODIUM CHLORIDE 9 MG/ML
INJECTION, SOLUTION INTRAVENOUS PRN
Status: DISCONTINUED | OUTPATIENT
Start: 2024-04-21 | End: 2024-04-22 | Stop reason: HOSPADM

## 2024-04-21 RX ADMIN — ASPIRIN 243 MG: 81 TABLET, CHEWABLE ORAL at 11:58

## 2024-04-21 RX ADMIN — SODIUM CHLORIDE, PRESERVATIVE FREE 10 ML: 5 INJECTION INTRAVENOUS at 22:16

## 2024-04-21 RX ADMIN — ATORVASTATIN CALCIUM 40 MG: 40 TABLET, FILM COATED ORAL at 21:49

## 2024-04-21 RX ADMIN — NITROGLYCERIN 0.4 MG: 0.4 TABLET, ORALLY DISINTEGRATING SUBLINGUAL at 11:59

## 2024-04-21 RX ADMIN — ROPINIROLE HYDROCHLORIDE 2 MG: 1 TABLET, FILM COATED ORAL at 21:49

## 2024-04-21 RX ADMIN — PANTOPRAZOLE SODIUM 20 MG: 20 TABLET, DELAYED RELEASE ORAL at 17:33

## 2024-04-21 RX ADMIN — ENOXAPARIN SODIUM 40 MG: 100 INJECTION SUBCUTANEOUS at 17:33

## 2024-04-21 ASSESSMENT — PAIN DESCRIPTION - PAIN TYPE: TYPE: ACUTE PAIN

## 2024-04-21 ASSESSMENT — PAIN DESCRIPTION - FREQUENCY: FREQUENCY: INTERMITTENT

## 2024-04-21 ASSESSMENT — PAIN DESCRIPTION - ONSET: ONSET: ON-GOING

## 2024-04-21 ASSESSMENT — PAIN - FUNCTIONAL ASSESSMENT
PAIN_FUNCTIONAL_ASSESSMENT: ACTIVITIES ARE NOT PREVENTED
PAIN_FUNCTIONAL_ASSESSMENT: 0-10

## 2024-04-21 ASSESSMENT — PAIN DESCRIPTION - DESCRIPTORS: DESCRIPTORS: PRESSURE

## 2024-04-21 ASSESSMENT — HEART SCORE: ECG: NORMAL

## 2024-04-21 ASSESSMENT — PAIN DESCRIPTION - ORIENTATION: ORIENTATION: LEFT

## 2024-04-21 ASSESSMENT — PAIN SCALES - GENERAL: PAINLEVEL_OUTOF10: 1

## 2024-04-21 ASSESSMENT — PAIN DESCRIPTION - LOCATION: LOCATION: CHEST

## 2024-04-21 NOTE — ED PROVIDER NOTES
medications, is starting to feel better.  Patient blood pressure is also improving.  Patient is still with chest pain however.    Given patient's risk factors (HEART score of 6) and abnormal troponin decision made to consult hospitalist for admission.  Delta troponin is stable from initial.    I discussed specific signs and symptoms and when to return to the emergency department as well as the need for close outpatient follow-up.  Questions sought and answered with the patient and spouse. They voice understanding and agree with plan.           Is this patient to be included in the SEP-1 Core Measure due to severe sepsis or septic shock?   No   Exclusion criteria - the patient is NOT to be included for SEP-1 Core Measure due to:  Infection is not suspected      Clinical Impression:  1. Chest pain, unspecified type    2. Troponin I above reference range      Disposition referral (if applicable):  No follow-up provider specified.  Disposition medications (if applicable):  New Prescriptions    No medications on file       Comment: Please note this report has been produced using speech recognition software and may contain errors related to that system including errors in grammar, punctuation, and spelling, as well as words and phrases that may be inappropriate. If there are any questions or concerns please feel free to contact the dictating provider for clarification.        Mario Rodríguez MD  04/21/24 9351

## 2024-04-21 NOTE — H&P
V2.0  History and Physical      Name:  Jose Funes /Age/Sex: 1955  (68 y.o. male)   MRN & CSN:  [id number] & [id number] Encounter Date/Time: 2024 1:27 PM EDT   Location:  ED26/ED-26 PCP: Anny Neely PA-C       Hospital Day: 1    Assessment and Plan:   Jose Funes is a 68 y.o. male who presents with Chest pain    Hospital Problems             Last Modified POA    * (Principal) Chest pain 2024 Yes       Plan:    Chest pain.  History of coronary artery disease   -Troponin went from -.  EKG with sinus bradycardia and new T wave inversions in lead III and aVF when compared to prior EKG.  -Cardiology consultation.  N.p.o. after midnight, hold beta-blocker for possible stress test  -Continue aspirin Plavix and statin.      Hypertension  -Continue home losartan.    COPD  -Continue home inhalers.  Not in exacerbation.    Type 2 diabetes mellitus  -Has been off Trulicity due to affordability reasons.  Start on medium dose sliding scale insulin.  Discharged home on oral meds.  Intolerant to metformin.  May benefit from SGLT2 inhibitor if covered by his insurance or sulfonylurea.    RLS  -Continue Requip.    Anxiety  -Continue Zoloft.    Disposition:   Current Living situation: Home  Expected Disposition: Home  Estimated D/C: 1 to 2 days    Diet Regular diet without caffeine.  N.p.o. after midnight   DVT Prophylaxis [x] Lovenox, []  Heparin, [] SCDs, [] Ambulation,  [] Eliquis, [] Xarelto, [] Coumadin   Code Status Full code   Surrogate Decision Maker/ POA Spouse Trang     Personally reviewed Lab Studies and Imaging     Discussed management of the case with ED provider who recommended observation and agree    EKG interpreted personally and results sinus rhythm.  T wave inversions in 3 and aVF which are new compared to prior    Imaging that was interpreted personally includes chest x-ray and results no acute changes    Drugs that require monitoring for toxicity include Lovenox and the method of

## 2024-04-21 NOTE — ED NOTES
ED TO INPATIENT SBAR HANDOFF    Patient Name: Jose Funes   :  1955  68 y.o.   Preferred Name  Jose   Family/Caregiver Present no   Restraints no   C-SSRS: Risk of Suicide: No Risk  Sitter no   Sepsis Risk Score Sepsis Risk Score: 1.65      Situation  Chief Complaint   Patient presents with    Chest Pain     Patient reports that chest pain started a couple hours ago while laying in bed. Sees Sabas Arroyo      Brief Description of Patient's Condition: Patient to the ED with complaints of chest pain. Patient has had open heart in the past, sees Sabas Arroyo. Patient states sudden chest pain while laying in bed this am. Vital signs have been stable, patient has eaten lunch. Patient is A&O, able to do own ADLs. Staying for tele and chest pain work up.   Mental Status: oriented, alert, coherent, logical, thought processes intact, and able to concentrate and follow conversation  Arrived from: home    Imaging:   XR CHEST PORTABLE   Final Result      1. No evidence for acute cardiopulmonary disease         Electronically signed by Nando García MD        Abnormal labs:   Abnormal Labs Reviewed   CBC WITH AUTO DIFFERENTIAL - Abnormal; Notable for the following components:       Result Value    RBC 4.38 (*)     Hematocrit 41.4 (*)     Monocytes % 6.3 (*)     All other components within normal limits   COMPREHENSIVE METABOLIC PANEL - Abnormal; Notable for the following components:    Glucose 217 (*)     AST 14 (*)     All other components within normal limits   TROPONIN - Abnormal; Notable for the following components:    Troponin, High Sensitivity 28 (*)     All other components within normal limits   TROPONIN - Abnormal; Notable for the following components:    Troponin, High Sensitivity 27 (*)     All other components within normal limits       Background  History:   Past Medical History:   Diagnosis Date    CAD (coronary atherosclerotic disease)     Centrilobular emphysema (HCC) 10/3/2019    Diabetes mellitus (HCC)

## 2024-04-22 ENCOUNTER — APPOINTMENT (OUTPATIENT)
Dept: NON INVASIVE DIAGNOSTICS | Age: 69
DRG: 313 | End: 2024-04-22
Payer: MEDICARE

## 2024-04-22 ENCOUNTER — APPOINTMENT (OUTPATIENT)
Dept: NUCLEAR MEDICINE | Age: 69
DRG: 313 | End: 2024-04-22
Payer: MEDICARE

## 2024-04-22 VITALS
TEMPERATURE: 97.5 F | OXYGEN SATURATION: 94 % | HEART RATE: 52 BPM | SYSTOLIC BLOOD PRESSURE: 157 MMHG | DIASTOLIC BLOOD PRESSURE: 81 MMHG | WEIGHT: 216.05 LBS | HEIGHT: 70 IN | RESPIRATION RATE: 18 BRPM | BODY MASS INDEX: 30.93 KG/M2

## 2024-04-22 PROBLEM — R07.9 CHEST PAIN: Status: RESOLVED | Noted: 2024-04-21 | Resolved: 2024-04-22

## 2024-04-22 LAB
CHOLEST SERPL-MCNC: 125 MG/DL
ECHO BSA: 2.19 M2
EKG ATRIAL RATE: 54 BPM
EKG DIAGNOSIS: NORMAL
EKG P AXIS: 27 DEGREES
EKG P-R INTERVAL: 178 MS
EKG Q-T INTERVAL: 446 MS
EKG QRS DURATION: 90 MS
EKG QTC CALCULATION (BAZETT): 422 MS
EKG R AXIS: 39 DEGREES
EKG T AXIS: 1 DEGREES
EKG VENTRICULAR RATE: 54 BPM
GLUCOSE BLD-MCNC: 172 MG/DL (ref 70–99)
GLUCOSE BLD-MCNC: 182 MG/DL (ref 70–99)
GLUCOSE BLD-MCNC: 274 MG/DL (ref 70–99)
HCT VFR BLD CALC: 40.1 % (ref 42–52)
HDLC SERPL-MCNC: 42 MG/DL
HEMOGLOBIN: 12.9 GM/DL (ref 13.5–18)
LDLC SERPL CALC-MCNC: 57 MG/DL
MCH RBC QN AUTO: 30.9 PG (ref 27–31)
MCHC RBC AUTO-ENTMCNC: 32.2 % (ref 32–36)
MCV RBC AUTO: 95.9 FL (ref 78–100)
NUC STRESS EJECTION FRACTION: 65 %
PDW BLD-RTO: 12.4 % (ref 11.7–14.9)
PLATELET # BLD: 173 K/CU MM (ref 140–440)
PMV BLD AUTO: 10.3 FL (ref 7.5–11.1)
RBC # BLD: 4.18 M/CU MM (ref 4.6–6.2)
STRESS BASELINE DIAS BP: 86 MMHG
STRESS BASELINE HR: 49 BPM
STRESS BASELINE SYS BP: 150 MMHG
STRESS ESTIMATED WORKLOAD: 1 METS
STRESS PEAK DIAS BP: 81 MMHG
STRESS PEAK SYS BP: 155 MMHG
STRESS PERCENT HR ACHIEVED: 50 %
STRESS POST PEAK HR: 77 BPM
STRESS RATE PRESSURE PRODUCT: NORMAL BPM*MMHG
STRESS TARGET HR: 154 BPM
TRIGL SERPL-MCNC: 130 MG/DL
WBC # BLD: 6 K/CU MM (ref 4–10.5)

## 2024-04-22 PROCEDURE — A9500 TC99M SESTAMIBI: HCPCS | Performed by: INTERNAL MEDICINE

## 2024-04-22 PROCEDURE — 93010 ELECTROCARDIOGRAM REPORT: CPT | Performed by: INTERNAL MEDICINE

## 2024-04-22 PROCEDURE — 2580000003 HC RX 258: Performed by: STUDENT IN AN ORGANIZED HEALTH CARE EDUCATION/TRAINING PROGRAM

## 2024-04-22 PROCEDURE — 93017 CV STRESS TEST TRACING ONLY: CPT

## 2024-04-22 PROCEDURE — 3430000000 HC RX DIAGNOSTIC RADIOPHARMACEUTICAL: Performed by: INTERNAL MEDICINE

## 2024-04-22 PROCEDURE — 6370000000 HC RX 637 (ALT 250 FOR IP): Performed by: STUDENT IN AN ORGANIZED HEALTH CARE EDUCATION/TRAINING PROGRAM

## 2024-04-22 PROCEDURE — 78452 HT MUSCLE IMAGE SPECT MULT: CPT

## 2024-04-22 PROCEDURE — 93016 CV STRESS TEST SUPVJ ONLY: CPT | Performed by: INTERNAL MEDICINE

## 2024-04-22 PROCEDURE — 80061 LIPID PANEL: CPT

## 2024-04-22 PROCEDURE — 93018 CV STRESS TEST I&R ONLY: CPT | Performed by: INTERNAL MEDICINE

## 2024-04-22 PROCEDURE — 6360000002 HC RX W HCPCS: Performed by: INTERNAL MEDICINE

## 2024-04-22 PROCEDURE — 78452 HT MUSCLE IMAGE SPECT MULT: CPT | Performed by: INTERNAL MEDICINE

## 2024-04-22 PROCEDURE — 94010 BREATHING CAPACITY TEST: CPT

## 2024-04-22 PROCEDURE — 36415 COLL VENOUS BLD VENIPUNCTURE: CPT

## 2024-04-22 PROCEDURE — 94761 N-INVAS EAR/PLS OXIMETRY MLT: CPT

## 2024-04-22 PROCEDURE — 99233 SBSQ HOSP IP/OBS HIGH 50: CPT | Performed by: INTERNAL MEDICINE

## 2024-04-22 PROCEDURE — 85027 COMPLETE CBC AUTOMATED: CPT

## 2024-04-22 PROCEDURE — 82962 GLUCOSE BLOOD TEST: CPT

## 2024-04-22 RX ORDER — TETRAKIS(2-METHOXYISOBUTYLISOCYANIDE)COPPER(I) TETRAFLUOROBORATE 1 MG/ML
10 INJECTION, POWDER, LYOPHILIZED, FOR SOLUTION INTRAVENOUS
Status: COMPLETED | OUTPATIENT
Start: 2024-04-22 | End: 2024-04-22

## 2024-04-22 RX ORDER — CARVEDILOL 6.25 MG/1
6.25 TABLET ORAL 2 TIMES DAILY
Qty: 60 TABLET | Refills: 3 | Status: SHIPPED | OUTPATIENT
Start: 2024-04-22

## 2024-04-22 RX ORDER — TETRAKIS(2-METHOXYISOBUTYLISOCYANIDE)COPPER(I) TETRAFLUOROBORATE 1 MG/ML
30 INJECTION, POWDER, LYOPHILIZED, FOR SOLUTION INTRAVENOUS
Status: COMPLETED | OUTPATIENT
Start: 2024-04-22 | End: 2024-04-22

## 2024-04-22 RX ORDER — CARVEDILOL 6.25 MG/1
6.25 TABLET ORAL 2 TIMES DAILY
Qty: 60 TABLET | Refills: 3 | Status: SHIPPED | OUTPATIENT
Start: 2024-04-22 | End: 2024-04-22

## 2024-04-22 RX ORDER — CARVEDILOL 12.5 MG/1
6.25 TABLET ORAL 2 TIMES DAILY WITH MEALS
Qty: 60 TABLET | Refills: 3
Start: 2024-04-22 | End: 2024-04-22 | Stop reason: HOSPADM

## 2024-04-22 RX ORDER — REGADENOSON 0.08 MG/ML
0.4 INJECTION, SOLUTION INTRAVENOUS
Status: COMPLETED | OUTPATIENT
Start: 2024-04-22 | End: 2024-04-22

## 2024-04-22 RX ADMIN — CLOPIDOGREL BISULFATE 75 MG: 75 TABLET ORAL at 08:22

## 2024-04-22 RX ADMIN — KIT FOR THE PREPARATION OF TECHNETIUM TC99M SESTAMIBI 30 MILLICURIE: 1 INJECTION, POWDER, LYOPHILIZED, FOR SOLUTION PARENTERAL at 12:30

## 2024-04-22 RX ADMIN — INSULIN LISPRO 4 UNITS: 100 INJECTION, SOLUTION INTRAVENOUS; SUBCUTANEOUS at 18:30

## 2024-04-22 RX ADMIN — SERTRALINE HYDROCHLORIDE 25 MG: 50 TABLET ORAL at 08:22

## 2024-04-22 RX ADMIN — REGADENOSON 0.4 MG: 0.08 INJECTION, SOLUTION INTRAVENOUS at 12:28

## 2024-04-22 RX ADMIN — PANTOPRAZOLE SODIUM 20 MG: 20 TABLET, DELAYED RELEASE ORAL at 18:30

## 2024-04-22 RX ADMIN — LOSARTAN POTASSIUM 100 MG: 100 TABLET, FILM COATED ORAL at 08:21

## 2024-04-22 RX ADMIN — SODIUM CHLORIDE, PRESERVATIVE FREE 10 ML: 5 INJECTION INTRAVENOUS at 08:22

## 2024-04-22 RX ADMIN — ASPIRIN 81 MG: 81 TABLET, CHEWABLE ORAL at 08:22

## 2024-04-22 RX ADMIN — ROPINIROLE HYDROCHLORIDE 2 MG: 1 TABLET, FILM COATED ORAL at 08:21

## 2024-04-22 RX ADMIN — KIT FOR THE PREPARATION OF TECHNETIUM TC99M SESTAMIBI 10 MILLICURIE: 1 INJECTION, POWDER, LYOPHILIZED, FOR SOLUTION PARENTERAL at 10:30

## 2024-04-22 ASSESSMENT — COPD QUESTIONNAIRES
QUESTION4_WALKINCLINE: 2
TOTAL_EXACERBATIONS_PASTYEAR: 0
GOLD_GROUP: GROUP B
QUESTION7_SLEEPQUALITY: 0
QUESTION3_CHESTTIGHTNESS: 0
QUESTION1_COUGHFREQUENCY: 0
QUESTION5_HOMEACTIVITIES: 0
QUESTION6_LEAVINGHOUSE: 0
CAT_TOTALSCORE: 4
QUESTION8_ENERGYLEVEL: 2
QUESTION2_CHESTPHLEGM: 0

## 2024-04-22 ASSESSMENT — PULMONARY FUNCTION TESTS
FEV1 (%PREDICTED): 39
PIF_VALUE: 110
POST BRONCHODILATOR FEV1/FVC: 98

## 2024-04-22 NOTE — CONSULTS
CARDIOLOGY CONSULT NOTE   Reason for consultation:  chest pain     Referring physician:  Lloyd Patel MD     Primary care physician: Anny eNely PA-C      Dear  Dr. Lloyd Patel MD   Thanks for the consult.    Chief Complaints :  Chief Complaint   Patient presents with    Chest Pain     Patient reports that chest pain started a couple hours ago while laying in bed. Sees Sabas Arroyo         History of present illness:Jose is a 68 y.o.year old who presents with chest chest pain which started yesterday out of the blue he describes it is fairly 5-10 somewhat reproducible tender somewhat he has been having some night sweats he says he has not been under a lot of stress  He normally sees Dr. Maki Arroyo at [place] [facility] he is history of two-vessel CABG in 2004 and stent in 2017 he does not smoke anymore  EKG shows sinus bradycardia    Past medical history:    has a past medical history of CAD (coronary atherosclerotic disease), Centrilobular emphysema (HCC), Diabetes mellitus (HCC), Hyperlipidemia, Hypertension, Other disorders of kidney and ureter, Primary osteoarthritis of left knee, and Psychiatric problem.  Past surgical history:   has a past surgical history that includes hernia repair; Coronary artery bypass graft; Cardiac surgery; Cardiac catheterization; and Coronary angioplasty with stent.  Social History:   reports that he quit smoking about 7 years ago. His smoking use included cigarettes. He started smoking about 37 years ago. He has a 45.0 pack-year smoking history. He has never used smokeless tobacco. He reports that he does not drink alcohol and does not use drugs.  Family history:   no family history of CAD, STROKE of DM at early age    No Known Allergies    clopidogrel (PLAVIX) tablet 75 mg, Daily  aspirin chewable tablet 81 mg, Daily  losartan (COZAAR) tablet 100 mg, Daily  atorvastatin (LIPITOR) tablet 40 mg, Nightly  sertraline (ZOLOFT) tablet 25 mg, Daily  pantoprazole (PROTONIX)

## 2024-04-22 NOTE — PLAN OF CARE
Problem: Discharge Planning  Goal: Discharge to home or other facility with appropriate resources  4/22/2024 0828 by Dawn Song RN  Outcome: Progressing  4/22/2024 0151 by Evan Arenas RN  Outcome: Progressing     Problem: Cardiovascular - Adult  Goal: Maintains optimal cardiac output and hemodynamic stability  4/22/2024 0828 by Dawn Song RN  Outcome: Progressing  4/22/2024 0151 by Evan Arenas RN  Outcome: Progressing  Goal: Absence of cardiac dysrhythmias or at baseline  4/22/2024 0828 by Dawn Song RN  Outcome: Progressing  4/22/2024 0151 by Evan Arenas RN  Outcome: Progressing

## 2024-04-22 NOTE — CARE COORDINATION
Pt lives at home with spouse.  Pt has a PCP and has insurance to help with with healthcare cost  Pt is independent of his ADLs.  No needs identified at this time.  CM available if needed.

## 2024-04-22 NOTE — PLAN OF CARE
Problem: Discharge Planning  Goal: Discharge to home or other facility with appropriate resources  Outcome: Progressing     Problem: Cardiovascular - Adult  Goal: Maintains optimal cardiac output and hemodynamic stability  Outcome: Progressing  Goal: Absence of cardiac dysrhythmias or at baseline  Outcome: Progressing

## 2024-04-22 NOTE — PROGRESS NOTES
04/22/24 1618   Spirometry Assessment   FEV1 (%PRED) 39   Post Bronchodilator FEV/FVC 98   COPD Exacerbations in last year 0    L/min   COPD Assessment (CAT Score)   Cough Assessment 0   Phlegm Assessment 0   Chest tightness 0   Walking on an incline 2   Home Activities 0   Confident Leaving The Home 0   Sleeping Soundly 0   Have Energy 2   Assessment Score 4   $RT COPD Assessment Yes   GOLD Staging   Group Group B     Current GOLD classification for Jose Funes      GOLD Stage:    Group: (P) Group B  Recorded domestic exacerbations past 12 months: (P) 0  Current recorded COPD Assessment Tool (CAT) score of (P) 4  Current eosinophil count: 0     Inhaler Device   Acceptable for Use   Respimat  Not Breath Actuated Yes   MDI  Not Breath Actuated Yes           DPI  Observed PIF   using  In-Check Meter   Optimal PIF   Acceptable for Use   HANDIHALER 110 >30 yes   Pressair 110 >45 yes   NEOHALER 110 >50 yes   Diskus 110 >60 yes   ELLIPTA 110 >60 yes     Records show Jose Funes was using Stoloto maintenance therapy prior to admission.          LONG-ACTING (LABA)   Arformoterol (Brovana) NEBULIZER   Indacaterol (Arcapta) NEOHALER   Olodaterol (Striverdi) Respimat   Salmeterol (Serevent) MDI, DISKUS   LONG-ACTING (LAMA)   Aclidinium bromide (Tudorza) PRESSAIR   Glycopyrronium bromide (Seebri) NEOHALER   Tiotropium (Spiriva) Respimat, HANDIHALER   Umeclidinium (Incruse) ELLIPTA   (LABA/LAMA)   Formoterol/glycopyrronium (Bevespi) MDI   Indacaterol/glycopyrronium (Utibron) NEOHALER   Vilanterol/umeclidinium (Anoro) ELLIPTA   Olodaterol/tiotropium (Stiolto) Respimat   (LABA/ICS)   Formoterol/budesomide (Symbicort) MDI   Formoterol/mometasone (Dulera) MDI   Salmeterol/fluticasone (Advair) MDI, DISKUS   Vilanterol/fluticasone (Breo) ELLIPTA   (LABA/LAMA/ICS)   Fluticasone/umeclidinium/vilanterol (Trelegy) ELLIPTA   Budesonide/glycopyrrolate/formoterol fumarate (Beztri) aerosphere     
4 Eyes Skin Assessment     NAME:  Jose Funes  YOB: 1955  MEDICAL RECORD NUMBER:  [id number]    The patient is being assessed for  Admission    I agree that at least one RN has performed a thorough Head to Toe Skin Assessment on the patient. ALL assessment sites listed below have been assessed.      Areas assessed by both nurses:    Head, Face, Ears, Shoulders, Back, Chest, Arms, Elbows, Hands, Sacrum. Buttock, Coccyx, Ischium, and Legs. Feet and Heels        Does the Patient have a Wound? No noted wound(s)       Emmanuel Prevention initiated by RN: No  Wound Care Orders initiated by RN: No    Pressure Injury (Stage 3,4, Unstageable, DTI, NWPT, and Complex wounds) if present, place Wound referral order by RN under : No    New Ostomies, if present place, Ostomy referral order under : No     Nurse 1 eSignature: Electronically signed by Janneth Mitchell RN on 4/21/24 at 4:09 PM EDT    **SHARE this note so that the co-signing nurse can place an eSignature**    Nurse 2 eSignature: Electronically signed by Dawn Santoyo RN on 4/21/24 at 4:13 PM EDT   
  POCT Glucose    Collection Time: 04/21/24  9:47 PM   Result Value Ref Range    POC Glucose 217 (H) 70 - 99 MG/DL   CBC    Collection Time: 04/22/24  3:34 AM   Result Value Ref Range    WBC 6.0 4.0 - 10.5 K/CU MM    RBC 4.18 (L) 4.6 - 6.2 M/CU MM    Hemoglobin 12.9 (L) 13.5 - 18.0 GM/DL    Hematocrit 40.1 (L) 42 - 52 %    MCV 95.9 78 - 100 FL    MCH 30.9 27 - 31 PG    MCHC 32.2 32.0 - 36.0 %    RDW 12.4 11.7 - 14.9 %    Platelets 173 140 - 440 K/CU MM    MPV 10.3 7.5 - 11.1 FL   Lipid Panel    Collection Time: 04/22/24  3:34 AM   Result Value Ref Range    Triglycerides 130 <150 MG/DL    Cholesterol 125 <200 MG/DL    HDL 42 >40 MG/DL    LDL Calculated 57 <100 MG/DL        Imaging/Diagnostics Last 24 Hours   XR CHEST PORTABLE    Result Date: 4/21/2024  EXAM: PORTABLE AP CHEST X-RAY, 4/21/2024 INDICATION: Chest Pain, COMPARISON: 3/3/2024 FINDINGS: HEART / MEDIASTINUM: Stable LUNGS/PLEURA: Chronic elevation of right hemidiaphragm, no pulmonary edema or consolidation. Right basilar compressive subsegmental atelectasis unchanged. BONES / SOFT TISSUES: No acute abnormality. OTHER: None.     1. No evidence for acute cardiopulmonary disease Electronically signed by Nando García MD      Electronically signed by Alba Mohr PA-C on 4/22/2024 at 7:41 AM

## 2024-04-23 NOTE — DISCHARGE SUMMARY
and Tests to be Followed up as an outpatient by PCP or Specialist:     Discharge Medications:        Medication List        CHANGE how you take these medications      carvedilol 6.25 MG tablet  Commonly known as: Coreg  Take 1 tablet by mouth 2 times daily  What changed:   medication strength  how much to take  when to take this            CONTINUE taking these medications      anastrozole 1 MG tablet  Commonly known as: ARIMIDEX     aspirin 81 MG EC tablet  Take 1 tablet by mouth daily     clopidogrel 75 MG tablet  Commonly known as: PLAVIX  Take 1 tablet by mouth daily     losartan 100 MG tablet  Commonly known as: COZAAR     neomycin-polymyxin-dexameth 3.5-65399-0.1 ophthalmic suspension  Commonly known as: MAXITROL     nitroGLYCERIN 0.4 MG SL tablet  Commonly known as: Nitrostat  up to max of 3 total doses. If no relief after 1 dose, call 911.     ondansetron 4 MG disintegrating tablet  Commonly known as: ZOFRAN-ODT  Take 1 tablet by mouth every 8 hours as needed for Nausea or Vomiting     rOPINIRole 1 MG tablet  Commonly known as: REQUIP     tiotropium-olodaterol 2.5-2.5 MCG/ACT Aers  Commonly known as: Stiolto Respimat  Inhale 2 puffs into the lungs daily     Trulicity 0.75 MG/0.5ML Sopn SC injection  Generic drug: dulaglutide     Ventolin  (90 Base) MCG/ACT inhaler  Generic drug: albuterol sulfate HFA               Where to Get Your Medications        These medications were sent to [facility] [id number] [address] [phone] -  [phone]  [facility] [address]      Phone: [phone]   carvedilol 6.25 MG tablet        Objective Findings at Discharge:   BP (!) 157/81   Pulse 52   Temp 97.5 °F (36.4 °C) (Oral)   Resp 18   Ht 1.778 m (5' 10\")   Wt 98 kg (216 lb 0.8 oz)   SpO2 94%   BMI 31.00 kg/m²       Physical Exam:   General: NAD  Eyes: EOMI  ENT: neck supple  Cardiovascular: Regular rate.  Respiratory: Clear to auscultation

## 2024-05-06 ENCOUNTER — APPOINTMENT (OUTPATIENT)
Dept: GENERAL RADIOLOGY | Age: 69
End: 2024-05-06
Attending: EMERGENCY MEDICINE
Payer: MEDICARE

## 2024-05-06 ENCOUNTER — HOSPITAL ENCOUNTER (EMERGENCY)
Age: 69
Discharge: HOME | End: 2024-05-06
Attending: EMERGENCY MEDICINE
Payer: MEDICARE

## 2024-05-06 VITALS
RESPIRATION RATE: 18 BRPM | DIASTOLIC BLOOD PRESSURE: 81 MMHG | OXYGEN SATURATION: 92 % | HEART RATE: 84 BPM | SYSTOLIC BLOOD PRESSURE: 136 MMHG | TEMPERATURE: 99 F

## 2024-05-06 DIAGNOSIS — J06.9 ACUTE UPPER RESPIRATORY INFECTION: ICD-10-CM

## 2024-05-06 DIAGNOSIS — J44.1 COPD EXACERBATION (HCC): Primary | ICD-10-CM

## 2024-05-06 PROCEDURE — 6370000000 HC RX 637 (ALT 250 FOR IP): Performed by: EMERGENCY MEDICINE

## 2024-05-06 PROCEDURE — 99283 EMERGENCY DEPT VISIT LOW MDM: CPT

## 2024-05-06 PROCEDURE — 71045 X-RAY EXAM CHEST 1 VIEW: CPT

## 2024-05-06 RX ORDER — PREDNISONE 20 MG/1
60 TABLET ORAL ONCE
Status: COMPLETED | OUTPATIENT
Start: 2024-05-06 | End: 2024-05-06

## 2024-05-06 RX ORDER — AZITHROMYCIN 250 MG/1
500 TABLET, FILM COATED ORAL ONCE
Status: COMPLETED | OUTPATIENT
Start: 2024-05-06 | End: 2024-05-06

## 2024-05-06 RX ORDER — PREDNISONE 20 MG/1
60 TABLET ORAL DAILY
Qty: 12 TABLET | Refills: 0 | Status: SHIPPED | OUTPATIENT
Start: 2024-05-06 | End: 2024-05-10

## 2024-05-06 RX ORDER — AZITHROMYCIN 250 MG/1
250 TABLET, FILM COATED ORAL DAILY
Qty: 4 TABLET | Refills: 0 | Status: SHIPPED | OUTPATIENT
Start: 2024-05-06 | End: 2024-05-10

## 2024-05-06 RX ADMIN — PREDNISONE 60 MG: 20 TABLET ORAL at 19:54

## 2024-05-06 RX ADMIN — AZITHROMYCIN DIHYDRATE 500 MG: 250 TABLET ORAL at 19:54

## 2024-05-06 ASSESSMENT — PAIN - FUNCTIONAL ASSESSMENT
PAIN_FUNCTIONAL_ASSESSMENT: NONE - DENIES PAIN
PAIN_FUNCTIONAL_ASSESSMENT: 0-10

## 2024-05-06 ASSESSMENT — PAIN SCALES - GENERAL: PAINLEVEL_OUTOF10: 0

## 2024-05-06 NOTE — ED PROVIDER NOTES
Impression:  1. COPD exacerbation (HCC)    2. Acute upper respiratory infection      (Please note that portions of this note may have been completed with a voice recognition program. Efforts were made to edit the dictations but occasionally words are mis-transcribed.)    MD LISA Cerna Ryan, MD  05/06/24 2015

## 2024-08-25 ENCOUNTER — HOSPITAL ENCOUNTER (OUTPATIENT)
Age: 69
Setting detail: OBSERVATION
Discharge: LEFT BEFORE DISCHARGE | End: 2024-08-26
Attending: STUDENT IN AN ORGANIZED HEALTH CARE EDUCATION/TRAINING PROGRAM | Admitting: STUDENT IN AN ORGANIZED HEALTH CARE EDUCATION/TRAINING PROGRAM
Payer: MEDICARE

## 2024-08-25 ENCOUNTER — APPOINTMENT (OUTPATIENT)
Dept: GENERAL RADIOLOGY | Age: 69
End: 2024-08-25
Attending: STUDENT IN AN ORGANIZED HEALTH CARE EDUCATION/TRAINING PROGRAM
Payer: MEDICARE

## 2024-08-25 DIAGNOSIS — R07.9 CHEST PAIN, UNSPECIFIED TYPE: Primary | ICD-10-CM

## 2024-08-25 LAB
ALBUMIN SERPL-MCNC: 4.2 GM/DL (ref 3.4–5)
ALP BLD-CCNC: 108 IU/L (ref 40–128)
ALT SERPL-CCNC: 15 U/L (ref 10–40)
ANION GAP SERPL CALCULATED.3IONS-SCNC: 9 MMOL/L (ref 7–16)
AST SERPL-CCNC: 16 IU/L (ref 15–37)
BASOPHILS ABSOLUTE: 0.1 K/CU MM
BASOPHILS RELATIVE PERCENT: 0.6 % (ref 0–1)
BILIRUB SERPL-MCNC: 0.6 MG/DL (ref 0–1)
BUN SERPL-MCNC: 14 MG/DL (ref 6–23)
CALCIUM SERPL-MCNC: 9.3 MG/DL (ref 8.3–10.6)
CHLORIDE BLD-SCNC: 99 MMOL/L (ref 99–110)
CO2: 28 MMOL/L (ref 21–32)
CREAT SERPL-MCNC: 1 MG/DL (ref 0.9–1.3)
DIFFERENTIAL TYPE: ABNORMAL
EOSINOPHILS ABSOLUTE: 0.2 K/CU MM
EOSINOPHILS RELATIVE PERCENT: 1.8 % (ref 0–3)
GFR, ESTIMATED: 82 ML/MIN/1.73M2
GLUCOSE SERPL-MCNC: 117 MG/DL (ref 70–99)
HCT VFR BLD CALC: 49.2 % (ref 42–52)
HEMOGLOBIN: 16.1 GM/DL (ref 13.5–18)
IMMATURE NEUTROPHIL %: 0.5 % (ref 0–0.43)
LYMPHOCYTES ABSOLUTE: 2.8 K/CU MM
LYMPHOCYTES RELATIVE PERCENT: 33.1 % (ref 24–44)
MCH RBC QN AUTO: 30.5 PG (ref 27–31)
MCHC RBC AUTO-ENTMCNC: 32.7 % (ref 32–36)
MCV RBC AUTO: 93.2 FL (ref 78–100)
MONOCYTES ABSOLUTE: 0.6 K/CU MM
MONOCYTES RELATIVE PERCENT: 7.2 % (ref 0–4)
NEUTROPHILS ABSOLUTE: 4.8 K/CU MM
NEUTROPHILS RELATIVE PERCENT: 56.8 % (ref 36–66)
NUCLEATED RBC %: 0 %
PDW BLD-RTO: 12 % (ref 11.7–14.9)
PLATELET # BLD: 213 K/CU MM (ref 140–440)
PMV BLD AUTO: 10.1 FL (ref 7.5–11.1)
POTASSIUM SERPL-SCNC: 4 MMOL/L (ref 3.5–5.1)
PRO-BNP: 182 PG/ML
RBC # BLD: 5.28 M/CU MM (ref 4.6–6.2)
REASON FOR REJECTION: NORMAL
REJECTED TEST: NORMAL
SODIUM BLD-SCNC: 136 MMOL/L (ref 135–145)
TOTAL IMMATURE NEUTOROPHIL: 0.04 K/CU MM
TOTAL NUCLEATED RBC: 0 K/CU MM
TOTAL PROTEIN: 7.9 GM/DL (ref 6.4–8.2)
TROPONIN, HIGH SENSITIVITY: 18 NG/L (ref 0–22)
WBC # BLD: 8.4 K/CU MM (ref 4–10.5)

## 2024-08-25 PROCEDURE — 71045 X-RAY EXAM CHEST 1 VIEW: CPT

## 2024-08-25 PROCEDURE — 93005 ELECTROCARDIOGRAM TRACING: CPT | Performed by: STUDENT IN AN ORGANIZED HEALTH CARE EDUCATION/TRAINING PROGRAM

## 2024-08-25 PROCEDURE — 80053 COMPREHEN METABOLIC PANEL: CPT

## 2024-08-25 PROCEDURE — 6370000000 HC RX 637 (ALT 250 FOR IP): Performed by: STUDENT IN AN ORGANIZED HEALTH CARE EDUCATION/TRAINING PROGRAM

## 2024-08-25 PROCEDURE — 99285 EMERGENCY DEPT VISIT HI MDM: CPT

## 2024-08-25 PROCEDURE — 83880 ASSAY OF NATRIURETIC PEPTIDE: CPT

## 2024-08-25 PROCEDURE — 85025 COMPLETE CBC W/AUTO DIFF WBC: CPT

## 2024-08-25 PROCEDURE — 84484 ASSAY OF TROPONIN QUANT: CPT

## 2024-08-25 RX ORDER — ASPIRIN 81 MG/1
324 TABLET, CHEWABLE ORAL ONCE
Status: COMPLETED | OUTPATIENT
Start: 2024-08-25 | End: 2024-08-25

## 2024-08-25 RX ORDER — NITROGLYCERIN 0.4 MG/1
0.4 TABLET SUBLINGUAL EVERY 5 MIN PRN
Status: DISCONTINUED | OUTPATIENT
Start: 2024-08-25 | End: 2024-08-26 | Stop reason: HOSPADM

## 2024-08-25 RX ORDER — ACETAMINOPHEN 500 MG
1000 TABLET ORAL ONCE
Status: COMPLETED | OUTPATIENT
Start: 2024-08-25 | End: 2024-08-25

## 2024-08-25 RX ADMIN — ACETAMINOPHEN 1000 MG: 500 TABLET ORAL at 22:46

## 2024-08-25 RX ADMIN — ASPIRIN 324 MG: 81 TABLET, CHEWABLE ORAL at 22:47

## 2024-08-25 ASSESSMENT — PAIN - FUNCTIONAL ASSESSMENT
PAIN_FUNCTIONAL_ASSESSMENT: 0-10
PAIN_FUNCTIONAL_ASSESSMENT: 0-10

## 2024-08-25 ASSESSMENT — PAIN SCALES - GENERAL
PAINLEVEL_OUTOF10: 3
PAINLEVEL_OUTOF10: 4
PAINLEVEL_OUTOF10: 4
PAINLEVEL_OUTOF10: 5

## 2024-08-25 ASSESSMENT — PAIN DESCRIPTION - LOCATION
LOCATION: CHEST;ARM;BACK
LOCATION: CHEST

## 2024-08-25 ASSESSMENT — PAIN DESCRIPTION - ORIENTATION: ORIENTATION: LEFT

## 2024-08-26 VITALS
TEMPERATURE: 98.1 F | HEART RATE: 87 BPM | DIASTOLIC BLOOD PRESSURE: 92 MMHG | OXYGEN SATURATION: 92 % | BODY MASS INDEX: 28.7 KG/M2 | RESPIRATION RATE: 23 BRPM | SYSTOLIC BLOOD PRESSURE: 160 MMHG | WEIGHT: 200 LBS

## 2024-08-26 PROBLEM — R07.9 CHEST PAIN AT REST: Status: ACTIVE | Noted: 2024-08-26

## 2024-08-26 LAB
EKG ATRIAL RATE: 82 BPM
EKG DIAGNOSIS: NORMAL
EKG P AXIS: 33 DEGREES
EKG P-R INTERVAL: 172 MS
EKG Q-T INTERVAL: 398 MS
EKG QRS DURATION: 84 MS
EKG QTC CALCULATION (BAZETT): 464 MS
EKG R AXIS: -9 DEGREES
EKG T AXIS: 57 DEGREES
EKG VENTRICULAR RATE: 82 BPM
TROPONIN, HIGH SENSITIVITY: 25 NG/L (ref 0–22)

## 2024-08-26 PROCEDURE — 93010 ELECTROCARDIOGRAM REPORT: CPT | Performed by: INTERNAL MEDICINE

## 2024-08-26 PROCEDURE — 84484 ASSAY OF TROPONIN QUANT: CPT

## 2024-08-26 PROCEDURE — G0378 HOSPITAL OBSERVATION PER HR: HCPCS

## 2024-08-26 RX ORDER — ATORVASTATIN CALCIUM 40 MG/1
40 TABLET, FILM COATED ORAL NIGHTLY
Status: DISCONTINUED | OUTPATIENT
Start: 2024-08-26 | End: 2024-08-26 | Stop reason: HOSPADM

## 2024-08-26 RX ORDER — ASPIRIN 81 MG/1
81 TABLET, CHEWABLE ORAL DAILY
Status: DISCONTINUED | OUTPATIENT
Start: 2024-08-26 | End: 2024-08-26 | Stop reason: HOSPADM

## 2024-08-26 RX ORDER — ENOXAPARIN SODIUM 100 MG/ML
40 INJECTION SUBCUTANEOUS DAILY
Status: DISCONTINUED | OUTPATIENT
Start: 2024-08-26 | End: 2024-08-26 | Stop reason: HOSPADM

## 2024-08-26 RX ORDER — ONDANSETRON 4 MG/1
4 TABLET, ORALLY DISINTEGRATING ORAL EVERY 8 HOURS PRN
Status: DISCONTINUED | OUTPATIENT
Start: 2024-08-26 | End: 2024-08-26 | Stop reason: HOSPADM

## 2024-08-26 RX ORDER — ACETAMINOPHEN 325 MG/1
650 TABLET ORAL EVERY 6 HOURS PRN
Status: DISCONTINUED | OUTPATIENT
Start: 2024-08-26 | End: 2024-08-26 | Stop reason: HOSPADM

## 2024-08-26 RX ORDER — ONDANSETRON 2 MG/ML
4 INJECTION INTRAMUSCULAR; INTRAVENOUS EVERY 6 HOURS PRN
Status: DISCONTINUED | OUTPATIENT
Start: 2024-08-26 | End: 2024-08-26 | Stop reason: HOSPADM

## 2024-08-26 RX ORDER — ACETAMINOPHEN 650 MG/1
650 SUPPOSITORY RECTAL EVERY 6 HOURS PRN
Status: DISCONTINUED | OUTPATIENT
Start: 2024-08-26 | End: 2024-08-26 | Stop reason: HOSPADM

## 2024-08-26 NOTE — PLAN OF CARE
Patient after my encounter decided to leave AMA even before I could complete H&P note. I did see him and discuss about elevated troponin trend. Will DC admit order and do ER AMA discharge.

## 2024-08-26 NOTE — ED PROVIDER NOTES
tablet Take 1 tablet by mouth daily 30 tablet 3    clopidogrel (PLAVIX) 75 MG tablet Take 1 tablet by mouth daily 30 tablet 3    rOPINIRole (REQUIP) 1 MG tablet Take 1 mg by mouth nightly      albuterol sulfate HFA (VENTOLIN HFA) 108 (90 Base) MCG/ACT inhaler Inhale 2 puffs into the lungs every 6 hours as needed for Wheezing      nitroGLYCERIN (NITROSTAT) 0.4 MG SL tablet up to max of 3 total doses. If no relief after 1 dose, call 911. 25 tablet 0         Nursing Notes Reviewed        PHYSICAL EXAM     ED Triage Vitals [08/25/24 1919]   BP Systolic BP Percentile Diastolic BP Percentile Temp Temp Source Pulse Respirations SpO2   (!) 160/97 -- -- 98.1 °F (36.7 °C) Oral 82 17 93 %      Height Weight - Scale         -- 90.7 kg (200 lb)           Triage VS:    ED Triage Vitals [08/25/24 1919]   Encounter Vitals Group      BP (!) 160/97      Systolic BP Percentile       Diastolic BP Percentile       Pulse 82      Respirations 17      Temp 98.1 °F (36.7 °C)      Temp Source Oral      SpO2 93 %      Weight - Scale 90.7 kg (200 lb)      Height       Head Circumference       Peak Flow       Pain Score       Pain Loc       Pain Education       Exclude from Growth Chart      Initial vital signs and nursing assessment reviewed and vitals are/show: Afebrile, Hypertensive, Normocardic, and Normal RR.   Pulsoximetry is normal per my interpretation.    Additional Vital Signs:  Vitals:    08/26/24 0202   BP: (!) 160/92   Pulse: 87   Resp: 23   Temp:    SpO2: 92%       Physical Exam  Constitutional:       General: He is not in acute distress.     Appearance: Normal appearance. He is not ill-appearing, toxic-appearing or diaphoretic.   HENT:      Head: Normocephalic and atraumatic.      Right Ear: External ear normal.      Left Ear: External ear normal.   Eyes:      General: No scleral icterus.        Right eye: No discharge.         Left eye: No discharge.   Cardiovascular:      Rate and Rhythm: Normal rate and regular rhythm.       Comments: Bilateral radial pulses equal bilateral DP pulses equal  Pulmonary:      Effort: Pulmonary effort is normal. No respiratory distress.      Breath sounds: Normal breath sounds. No stridor. No wheezing, rhonchi or rales.   Chest:      Chest wall: No tenderness.   Abdominal:      General: Abdomen is flat. There is no distension.      Palpations: Abdomen is soft.      Tenderness: There is no abdominal tenderness. There is no guarding or rebound.   Musculoskeletal:      Cervical back: Neck supple.      Right lower leg: No edema.      Left lower leg: No edema.   Skin:     General: Skin is warm and dry.   Neurological:      Mental Status: He is alert and oriented to person, place, and time. Mental status is at baseline.   Psychiatric:         Mood and Affect: Mood normal.         Behavior: Behavior normal.         Thought Content: Thought content normal.         Judgment: Judgment normal.           MEDICAL DECISION MAKING/ED COURSE   Initial Assessment:     69 y.o. male presenting to the ED for   Chief Complaint   Patient presents with    Chest Pain       Differential diagnoses include but not limited to:  ACS ACS, arrhythmia, aortic dissection, cardiac tamponade, pneumothorax, PE, esophageal rupture, pneumonia, CHF, COPD, myocarditis, pericarditis, electrolyte abnormality, valvular heart disease, GERD, musculoskeletal    Plan:       EKG  Labs  Imaging  Analgesia  HEAT Score: 5  admission        Brief Summary of Patient Presentation:    Patient is a 69 y.o. male with a past medical history of CVA HTN hyperlipidemia CAD DM COPD who was seen and evaluated in the emergency department for chest pain.  Been ongoing for a few weeks but he put it off so he could get a cataract surgery.  Says it feels similar to previous cardiac chest pain requiring stenting.  EKG is nonischemic.  Imaging unremarkable.  Initial high-sensitivity troponin was within normal limits.  His heart score is a 5 so I felt he would benefit from  tablet 1,000 mg (1,000 mg Oral Given 8/25/24 2246)   aspirin chewable tablet 324 mg (324 mg Oral Given 8/25/24 2247)       DISCHARGE PRESCRIPTIONS: (None if blank)  Discharge Medication List as of 8/26/2024  2:15 AM          I have reviewed and interpreted all of the currently available lab results from this visit (if applicable):    Radiographs (if obtained):  Radiologist's Report Reviewed:  XR CHEST PORTABLE   Final Result   No active disease.         Electronically signed by Russell Nice          LABS: (none if blank)  Labs Reviewed   CBC WITH AUTO DIFFERENTIAL - Abnormal; Notable for the following components:       Result Value    Monocytes % 7.2 (*)     Immature Neutrophil % 0.5 (*)     All other components within normal limits   COMPREHENSIVE METABOLIC PANEL W/ REFLEX TO MG FOR LOW K - Abnormal; Notable for the following components:    Glucose 117 (*)     All other components within normal limits   TROPONIN - Abnormal; Notable for the following components:    Troponin, High Sensitivity 25 (*)     All other components within normal limits   BRAIN NATRIURETIC PEPTIDE   TROPONIN   SPECIMEN REJECTION   SPECIMEN REJECTION   TROPONIN       FINAL IMPRESSION      Final diagnoses:   Chest pain, unspecified type     Condition: stable  Dispo: Admission      This transcription was electronically signed. Parts of this transcriptions may have been dictated by use of voice recognition software and electronically transcribed, and parts may have been transcribed with the assistance of an ED scribe and may contain errors related to that system including errors in grammar, punctuation, and spelling, as well as words and phrases that may be inappropriate.  The transcription may contain errors not detected in proofreading.  Efforts were made to edit the dictations.    Electronically Signed: Russell Rushing MD, 08/28/24, 11:40 AM    I am the Primary Clinician of Record.      Clinical Impression:  1. Chest pain, unspecified

## 2024-08-26 NOTE — ED NOTES
Patient again decided to leave AMA. Refused to sign AMA. IV access removed by this RN. Personal belongings gathered by patient.

## 2024-08-26 NOTE — PLAN OF CARE
Patient after my encounter decided to leave AMA even before I could complete H&P note. I did see him and discuss about elevated troponin trend. He decided to stay but then a few minutes later left AMA. ER AMA discharge.

## 2024-08-26 NOTE — ED NOTES
ED TO INPATIENT SBAR HANDOFF    Patient Name: Jose Funes   :  1955  69 y.o.   Preferred Name  Jose   Family/Caregiver Present no   Restraints no   C-SSRS: Risk of Suicide: No Risk  Sitter no   Sepsis Risk Score        Situation  Chief Complaint   Patient presents with    Chest Pain     Brief Description of Patient's Condition: chest pain  Mental Status: oriented and alert  Arrived from: home    Imaging:   XR CHEST PORTABLE   Final Result   No active disease.         Electronically signed by Russell Nice        Abnormal labs:   Abnormal Labs Reviewed   CBC WITH AUTO DIFFERENTIAL - Abnormal; Notable for the following components:       Result Value    Monocytes % 7.2 (*)     Immature Neutrophil % 0.5 (*)     All other components within normal limits   COMPREHENSIVE METABOLIC PANEL W/ REFLEX TO MG FOR LOW K - Abnormal; Notable for the following components:    Glucose 117 (*)     All other components within normal limits   TROPONIN - Abnormal; Notable for the following components:    Troponin, High Sensitivity 25 (*)     All other components within normal limits        Background  History:   Past Medical History:   Diagnosis Date    CAD (coronary atherosclerotic disease)     Centrilobular emphysema (HCC) 10/3/2019    Diabetes mellitus (HCC)     Hyperlipidemia     Hypertension     Other disorders of kidney and ureter     kidney stone    Primary osteoarthritis of left knee 2022    Psychiatric problem        Assessment    Vitals:        Vitals:    24 2332 24 2349 24 0002 24 0014   BP: (!) 155/105 (!) 177/107 (!) 168/102 (!) 153/105   Pulse: 71 65 73 77   Resp:  22 26 (!) 32   Temp:       TempSrc:       SpO2: 92% 93% 92% 92%   Weight:         PO Status: Nothing by Mouth  O2 Flow Rate: O2 Device: None (Room air)    Cardiac Rhythm: SR  Last documented pain medication administered: tylenol 1000mg @ 2246  NIH Score: NIH     Active LDA's:      Pertinent or High Risk Medications/Drips: no

## 2024-08-26 NOTE — ED NOTES
Patient requesting discharge home. States admitting physician gave him an option for admission or following up outpatient. Charge nurse notified.

## 2025-04-16 ENCOUNTER — TRANSCRIBE ORDERS (OUTPATIENT)
Dept: ADMINISTRATIVE | Age: 70
End: 2025-04-16

## 2025-04-16 ENCOUNTER — HOSPITAL ENCOUNTER (EMERGENCY)
Age: 70
Discharge: HOME | End: 2025-04-16
Payer: MEDICARE

## 2025-04-16 VITALS
TEMPERATURE: 97.6 F | WEIGHT: 205 LBS | SYSTOLIC BLOOD PRESSURE: 162 MMHG | OXYGEN SATURATION: 94 % | RESPIRATION RATE: 16 BRPM | HEIGHT: 70 IN | DIASTOLIC BLOOD PRESSURE: 96 MMHG | HEART RATE: 64 BPM | BODY MASS INDEX: 29.35 KG/M2

## 2025-04-16 DIAGNOSIS — Z98.890 STATUS POST INCISION AND DRAINAGE: ICD-10-CM

## 2025-04-16 DIAGNOSIS — T14.8XXA BLEEDING FROM WOUND: Primary | ICD-10-CM

## 2025-04-16 DIAGNOSIS — Z87.891 PERSONAL HISTORY OF TOBACCO USE, PRESENTING HAZARDS TO HEALTH: Primary | ICD-10-CM

## 2025-04-16 PROCEDURE — 99283 EMERGENCY DEPT VISIT LOW MDM: CPT

## 2025-04-16 PROCEDURE — 6360000002 HC RX W HCPCS: Performed by: PHYSICIAN ASSISTANT

## 2025-04-16 RX ORDER — SULFAMETHOXAZOLE AND TRIMETHOPRIM 800; 160 MG/1; MG/1
1 TABLET ORAL 2 TIMES DAILY
Qty: 14 TABLET | Refills: 0 | Status: SHIPPED | OUTPATIENT
Start: 2025-04-16 | End: 2025-04-23

## 2025-04-16 RX ORDER — CEPHALEXIN 500 MG/1
CAPSULE ORAL
COMMUNITY
Start: 2025-04-16

## 2025-04-16 RX ORDER — LIDOCAINE HYDROCHLORIDE AND EPINEPHRINE 10; 10 MG/ML; UG/ML
20 INJECTION, SOLUTION INFILTRATION; PERINEURAL ONCE
Status: COMPLETED | OUTPATIENT
Start: 2025-04-16 | End: 2025-04-16

## 2025-04-16 RX ADMIN — LIDOCAINE HYDROCHLORIDE,EPINEPHRINE BITARTRATE 20 ML: 10; .01 INJECTION, SOLUTION INFILTRATION; PERINEURAL at 16:04

## 2025-04-16 ASSESSMENT — PAIN - FUNCTIONAL ASSESSMENT: PAIN_FUNCTIONAL_ASSESSMENT: NONE - DENIES PAIN

## 2025-04-16 NOTE — DISCHARGE INSTRUCTIONS
Some mild bleeding is to be expected today and early tomorrow.  You can apply for impression and change dressings if they soaked through.      As we discussed, return to Emergency Department if you have any weakness, shortness of breath, lightheadedness, passing out, worsening symptoms or any new concerns.    Please continue the antibiotic previously prescribed (cephalexin) I would recommend starting the additional medication we have prescribed you today to cover additional skin bacteria (sulfamethoxazole trimethoprim )     Additionally would recommend contacting the wound care clinic this week to schedule an appointment for reevaluation of your wound and further management.    As we discussed, daily remove dressing and packing, and reapply packing strips as we discussed today    Follow-up with your primary care provider for reevaluation of your blood pressure and your COPD.

## 2025-04-16 NOTE — ED TRIAGE NOTES
Patient presents with c/o bleeding wound. Patient had a spider bite lanced this afternoon by PCP. He is on blood thinners.

## 2025-04-16 NOTE — ED PROVIDER NOTES
**ADVANCED PRACTICE PROVIDER, I HAVE EVALUATED THIS PATIENT**        [facility] EMERGENCY DEPARTMENT  EMERGENCY DEPARTMENT ENCOUNTER      Pt Name: Jose Funes  MRN:[id number]  Birthdate 1955  Date of evaluation: 4/16/2025  Provider: Jose Keller PA-C      Chief Complaint:    Chief Complaint   Patient presents with    Wound Check     Patient had a spider bite lanced this afternoon by PCP. Wound continues to bleed. Patient is on blood thinners.          Nursing Notes, Past Medical Hx, Past Surgical Hx, Social Hx, Allergies, and Family Hx were all reviewed and agreed with or any disagreements were addressed in the HPI.    HISTORY OF PRESENT ILLNESS     History from : Patient and Spouse    Limitations to history : None    Jose Funes is a 69 y.o. male who presents accompanied by his wife, with complaint of bleeding from incision and drainage site.  On imaging the patient is on aspirin, and Plavix last dose this morning.  Patient mentions an area of swelling and redness over his right shoulder, for little over a week.  Had been seen at the primary care providers twice.  Initially placed on antibiotics cephalexin, then returned today, where incision and drainage apparently was performed.  Spouse mentions that PCP had not noticed any drainage.  She mentions that they were given another prescription for cephalexin, and to return to their office as needed.  They are concerned today has the dressing applied at the PCPs office had become saturated and starting to drain through.  They state that the initial wound was from a spider bite, however no recent travel or camping, or visualized spiders.  Patient with known history of COPD, per wife, hypoxia at home.  Patient stating his shortness of breath is at his baseline.  Denying any fevers, chest pain, chills, joint pain anticoagulation.    PastMedical/Surgical History:      Diagnosis Date    CAD (coronary atherosclerotic disease)     Centrilobular

## 2025-06-19 ENCOUNTER — APPOINTMENT (OUTPATIENT)
Dept: GENERAL RADIOLOGY | Age: 70
End: 2025-06-19
Payer: MEDICARE

## 2025-06-19 ENCOUNTER — HOSPITAL ENCOUNTER (EMERGENCY)
Age: 70
Discharge: HOME | End: 2025-06-20
Attending: EMERGENCY MEDICINE
Payer: MEDICARE

## 2025-06-19 VITALS
TEMPERATURE: 98.3 F | RESPIRATION RATE: 18 BRPM | OXYGEN SATURATION: 92 % | DIASTOLIC BLOOD PRESSURE: 85 MMHG | WEIGHT: 205 LBS | SYSTOLIC BLOOD PRESSURE: 116 MMHG | BODY MASS INDEX: 29.35 KG/M2 | HEIGHT: 70 IN | HEART RATE: 86 BPM

## 2025-06-19 DIAGNOSIS — S82.141A CLOSED FRACTURE OF RIGHT TIBIAL PLATEAU, INITIAL ENCOUNTER: Primary | ICD-10-CM

## 2025-06-19 PROCEDURE — 99283 EMERGENCY DEPT VISIT LOW MDM: CPT

## 2025-06-19 PROCEDURE — 73562 X-RAY EXAM OF KNEE 3: CPT

## 2025-06-19 RX ORDER — HYDROCODONE BITARTRATE AND ACETAMINOPHEN 5; 325 MG/1; MG/1
1 TABLET ORAL EVERY 6 HOURS PRN
Qty: 8 TABLET | Refills: 0 | Status: SHIPPED | OUTPATIENT
Start: 2025-06-19 | End: 2025-06-22

## 2025-06-19 RX ORDER — HYDROCODONE BITARTRATE AND ACETAMINOPHEN 5; 325 MG/1; MG/1
1 TABLET ORAL ONCE
Status: COMPLETED | OUTPATIENT
Start: 2025-06-20 | End: 2025-06-20

## 2025-06-19 ASSESSMENT — PAIN DESCRIPTION - ORIENTATION: ORIENTATION: RIGHT

## 2025-06-19 ASSESSMENT — PAIN SCALES - GENERAL: PAINLEVEL_OUTOF10: 10

## 2025-06-19 ASSESSMENT — PAIN - FUNCTIONAL ASSESSMENT: PAIN_FUNCTIONAL_ASSESSMENT: 0-10

## 2025-06-19 ASSESSMENT — PAIN DESCRIPTION - LOCATION: LOCATION: KNEE

## 2025-06-20 PROCEDURE — 6370000000 HC RX 637 (ALT 250 FOR IP): Performed by: EMERGENCY MEDICINE

## 2025-06-20 RX ADMIN — HYDROCODONE BITARTRATE AND ACETAMINOPHEN 1 TABLET: 5; 325 TABLET ORAL at 00:05

## 2025-06-20 ASSESSMENT — PAIN SCALES - GENERAL
PAINLEVEL_OUTOF10: 10
PAINLEVEL_OUTOF10: 10

## 2025-06-20 ASSESSMENT — PAIN - FUNCTIONAL ASSESSMENT: PAIN_FUNCTIONAL_ASSESSMENT: 0-10

## 2025-06-20 NOTE — ED TRIAGE NOTES
Patient presents with c/o right knee injury after falling down 4 stairs. Patient states he tripped over his cat. He is on plavix, denies hitting his head.

## 2025-06-20 NOTE — ED PROVIDER NOTES
CHIEF COMPLAINT    Chief Complaint   Patient presents with    Knee Injury     HPI  Jose Funes is a 69 y.o. male with history of coronary artery disease, diabetes, CVA who presents to the ED with complaints of right knee injury.  Patient states that he lost his balance tripping over his cat on basement stairs causing him to fall down 3-4 steps.  He struck his right knee during the fall and states that he has pain to the anterior knee with difficulty weightbearing since that time.  The pain is described as a stabbing pain that is constant and moderate to severe in severity.  Pain exacerbated with palpation and weightbearing.  Nothing makes it better.  Denies numbness, tingling, nausea, vomiting.  Did not strike head or lose consciousness during the fall.      REVIEW OF SYSTEMS  Constitutional: No fever, chills   Eye: No visual changes  HENT: No earache or sore throat.  Resp: No SOB or productive cough.  Cardio: No chest pain or palpitations.  GI: No abdominal pain, nausea, vomiting, constipation or diarrhea. No melena.  : No dysuria, urgency or frequency.  Endocrine: No heat intolerance, no cold intolerance, no polydipsia   Lymphatics: No adenopathy  Musculoskeletal: Complains of right knee injury/pain  Neuro: No headaches.  Psych: No homicidal or suicidal thoughts  Skin: No rash, No itching.  ?  ?  PAST MEDICAL HISTORY  Past Medical History:   Diagnosis Date    CAD (coronary atherosclerotic disease)     Centrilobular emphysema (HCC) 10/3/2019    Diabetes mellitus (HCC)     Hyperlipidemia     Hypertension     Other disorders of kidney and ureter     kidney stone    Primary osteoarthritis of left knee 5/20/2022    Psychiatric problem      FAMILY HISTORY  Family History   Problem Relation Age of Onset    High Blood Pressure Mother     Stroke Mother     Cancer Father     Early Death Brother     Heart Disease Brother     High Blood Pressure Brother     Arthritis Neg Hx     Asthma Neg Hx     Birth Defects Neg Hx      posterior drawer testing to the right knee.  He has normal sensation throughout right lower extremity.    Differentials at this time include but not limited to: Ligamentous injury, cartilaginous injury, knee fracture, knee contusion    X-ray of right knee obtained here shows comminuted and slightly depressed tibial plateau fracture on the right with moderate hemarthrosis.  At this time patient provided with knee immobilizer and crutches.  Provided with a dose of Norco here.  He will be discharged home with a prescription for Norco.  We will avoid NSAIDs in this patient as he is already on aspirin and Plavix.  We discussed nonweightbearing status of the right lower extremity till he follows up with orthopedic surgery and he was instructed to call orthopedic surgery in the morning.  Patient voiced understanding.  Discharged in stable condition.  Return precautions provided.      NIH score 0    Amount and/or Complexity of Data Reviewed  Clinical lab tests: reviewed  Decide to obtain previous medical records or to obtain history from someone other than the patient: yes       -  Patient seen and evaluated in the emergency department.  -  Triage and nursing notes reviewed and incorporated.  -  Old chart records reviewed and incorporated.  -  Work-up included:  See above      Appropriate PPE utilized as indicated for entire patient encounter?  Time of Disposition: See timeline      Independent Imaging Interpretation by me: X-ray of right knee     EKG (if obtained): None     Chronic conditions affecting care: Antiplatelet use, coronary arteries, CVA     Discussion with Other Profesionals : None       Social Determinants : None          I am the Primary Clinician of Record.    ?  New Prescriptions    HYDROCODONE-ACETAMINOPHEN (NORCO) 5-325 MG PER TABLET    Take 1 tablet by mouth every 6 hours as needed for Pain for up to 3 days. Intended supply: 3 days. Take lowest dose possible to manage pain Max Daily Amount: 4 tablets

## 2025-07-01 ENCOUNTER — OFFICE VISIT (OUTPATIENT)
Dept: ORTHOPEDIC SURGERY | Age: 70
End: 2025-07-01
Payer: MEDICARE

## 2025-07-01 VITALS — BODY MASS INDEX: 29.41 KG/M2 | OXYGEN SATURATION: 89 % | HEART RATE: 67 BPM | HEIGHT: 70 IN

## 2025-07-01 DIAGNOSIS — M17.12 PRIMARY OSTEOARTHRITIS OF LEFT KNEE: ICD-10-CM

## 2025-07-01 DIAGNOSIS — S82.141A CLOSED FRACTURE OF RIGHT TIBIAL PLATEAU, INITIAL ENCOUNTER: Primary | ICD-10-CM

## 2025-07-01 PROCEDURE — 99214 OFFICE O/P EST MOD 30 MIN: CPT | Performed by: ORTHOPAEDIC SURGERY

## 2025-07-01 PROCEDURE — 1159F MED LIST DOCD IN RCRD: CPT | Performed by: ORTHOPAEDIC SURGERY

## 2025-07-01 PROCEDURE — 1125F AMNT PAIN NOTED PAIN PRSNT: CPT | Performed by: ORTHOPAEDIC SURGERY

## 2025-07-01 PROCEDURE — 1123F ACP DISCUSS/DSCN MKR DOCD: CPT | Performed by: ORTHOPAEDIC SURGERY

## 2025-07-01 RX ORDER — HYDROCODONE BITARTRATE AND ACETAMINOPHEN 5; 325 MG/1; MG/1
1 TABLET ORAL EVERY 8 HOURS PRN
Qty: 21 TABLET | Refills: 0 | Status: CANCELLED | OUTPATIENT
Start: 2025-07-01 | End: 2025-07-08

## 2025-07-01 NOTE — PATIENT INSTRUCTIONS
Central scheduling [phone] will be calling you to schedule your CT.  If you do not hear from them with in a week give them a call.   Follow up in 3 weeks to discuss the results of the CT ordered.

## 2025-07-04 PROBLEM — S82.141A CLOSED FRACTURE OF RIGHT TIBIAL PLATEAU: Status: ACTIVE | Noted: 2025-07-04

## 2025-07-04 RX ORDER — HYDROCODONE BITARTRATE AND ACETAMINOPHEN 7.5; 325 MG/1; MG/1
1 TABLET ORAL EVERY 8 HOURS PRN
Qty: 21 TABLET | Refills: 0 | Status: SHIPPED | OUTPATIENT
Start: 2025-07-04 | End: 2025-07-11

## 2025-07-04 ASSESSMENT — ENCOUNTER SYMPTOMS
EYE PAIN: 0
EYE REDNESS: 0
VOMITING: 0
CHEST TIGHTNESS: 0
SHORTNESS OF BREATH: 0
WHEEZING: 0
COLOR CHANGE: 0

## 2025-07-04 NOTE — PROGRESS NOTES
Pt presents to the office with an injury, closed fracture of right tibial plateau. DOI: 6/19/25.  Pt states he fell down his basement stairs after tripping over his cat. Pt states he has swelling in his right knee all the way down to his foot. Pt states he has been using the crutches to get around while wearing a brace. Pt states he has been applying ice and elevating his leg. Pt states he has been taking pain medication and OTC medication to help with the pain along with applying voltaren gel. Pt rates his pain 10/10. Pt states he has applied some weight to that leg and has quickly caught himself.   
Negative for agitation. The patient is not nervous/anxious.                                                Examination:  General Exam:  Vitals: Pulse 67   Ht 1.778 m (5' 10\")   SpO2 (!) 89%   BMI 29.41 kg/m²    Physical Exam  Vitals and nursing note reviewed.   Constitutional:       Appearance: Normal appearance.   HENT:      Head: Normocephalic and atraumatic.   Eyes:      Conjunctiva/sclera: Conjunctivae normal.      Pupils: Pupils are equal, round, and reactive to light.   Pulmonary:      Effort: Pulmonary effort is normal.   Musculoskeletal:      Cervical back: Normal range of motion.      Right hip: No deformity, tenderness, bony tenderness or crepitus. Normal range of motion. Normal strength.      Left hip: Normal.      Left knee: No swelling, deformity, effusion, ecchymosis or lacerations. Normal range of motion. No tenderness. No medial joint line or lateral joint line tenderness. No LCL laxity or MCL laxity.Normal alignment and normal meniscus.      Comments: Right lower extremity:  There is severe tenderness palpation diffusely throughout the knee most significant over the lateral proximal tibia.  Mild diffuse soft tissue swelling present throughout the knee most significant along the lateral border.    Normal alignment of the knee with no gross instability.  No significant pain or instability with gentle active and passive range of motion through a very limited amount of flexion and extension of the knee with no more flexion and 80 degrees and extension less than 10 degrees.    Sensation motor functions intact throughout the lower extremity.  Calf is soft and nontender.  He is able to actively flex and extend the ankle.  No gross deformity or instability present at the knee during gentle manipulation.   Skin:     General: Skin is warm and dry.   Neurological:      Mental Status: He is alert and oriented to person, place, and time.   Psychiatric:         Mood and Affect: Mood normal.         Behavior:

## 2025-07-11 ENCOUNTER — HOSPITAL ENCOUNTER (OUTPATIENT)
Dept: CT IMAGING | Age: 70
Discharge: HOME | End: 2025-07-11
Attending: ORTHOPAEDIC SURGERY
Payer: MEDICARE

## 2025-07-11 DIAGNOSIS — S82.141A CLOSED FRACTURE OF RIGHT TIBIAL PLATEAU, INITIAL ENCOUNTER: ICD-10-CM

## 2025-07-11 PROCEDURE — 73700 CT LOWER EXTREMITY W/O DYE: CPT

## 2025-07-17 ENCOUNTER — OFFICE VISIT (OUTPATIENT)
Dept: ORTHOPEDIC SURGERY | Age: 70
End: 2025-07-17

## 2025-07-17 VITALS — OXYGEN SATURATION: 97 % | HEIGHT: 70 IN | RESPIRATION RATE: 14 BRPM | HEART RATE: 60 BPM | BODY MASS INDEX: 29.41 KG/M2

## 2025-07-17 DIAGNOSIS — S82.141A CLOSED FRACTURE OF RIGHT TIBIAL PLATEAU, INITIAL ENCOUNTER: Primary | ICD-10-CM

## 2025-07-17 RX ORDER — HYDROCODONE BITARTRATE AND ACETAMINOPHEN 5; 325 MG/1; MG/1
1 TABLET ORAL EVERY 8 HOURS PRN
Qty: 20 TABLET | Refills: 0 | Status: SHIPPED | OUTPATIENT
Start: 2025-07-17 | End: 2025-07-24

## 2025-07-17 NOTE — PROGRESS NOTES
7/17/2025   Chief Complaint   Patient presents with    Knee Pain     Right knee         History of Present Illness:                             Jose Funes is a 70 y.o. male returns today for follow-up of his right knee.  He has been doing well with his brace and crutches.  He feels that his knee is improving with his range of motion and pain level.  He denies any problems or setbacks issues    Patient returns to the office with a right knee injury and to discuss CT results. Pt has been using crutches and working with touch down weightbearing while walking. Pt is wearing the brace and has seen comfort with it. Pt stated his pain is slightly improved since the last visit and has been taking his medication PRN.      Ct Results of the right knee completed: 7/11/25  IMPRESSION:  1. Comminuted and depressed fracture of the lateral tibial plateau as above. A   component of the fracture line extends across midline deep to the tibial spines   contacting the articular surface of the medial tibial plateau as described    Medical History  Patient's medications, allergies, past medical, surgical, social and family histories were reviewed and updated as appropriate.      Review of Systems                                            Examination:  General Exam:  Vitals: Pulse 60   Resp 14   Ht 1.778 m (5' 10\")   SpO2 97%   BMI 29.41 kg/m²    Physical Exam   Right lower extremity:  Improved tenderness palpation diffusely throughout the knee most significant at the lateral aspect the proximal tibia.  Normal alignment of the knee.  No pain or instability with gentle active flexion extension of the knee.  Extensor mechanism is intact against gravity.  Sensation and motor functions intact throughout the lower extremity.    There is some tenderness to palpation along the mid aspect of the calf medially.  There is some mild firmness in the area.  No surrounding erythema or discoloration of the soft tissues.  Patient able to flex

## 2025-07-17 NOTE — PROGRESS NOTES
Patient returns to the office with a right knee injury and to discuss CT results. Pt has been using crutches and working with touch down weightbearing while walking. Pt is wearing the brace and has seen comfort with it. Pt stated his pain is slightly improved since the last visit and has been taking his medication PRN.     Ct Results of the right knee completed: 7/11/25  IMPRESSION:  1. Comminuted and depressed fracture of the lateral tibial plateau as above. A   component of the fracture line extends across midline deep to the tibial spines   contacting the articular surface of the medial tibial plateau as described

## 2025-07-17 NOTE — PATIENT INSTRUCTIONS
Continue weight-bearing as tolerated.  Continue range of motion exercises as instructed.  Ice and elevate as needed.  Tylenol or Motrin for pain.  Follow up in 1 month.

## 2025-08-27 ENCOUNTER — TRANSCRIBE ORDERS (OUTPATIENT)
Dept: ADMINISTRATIVE | Age: 70
End: 2025-08-27

## 2025-08-27 DIAGNOSIS — I71.40 ABDOMINAL AORTIC ANEURYSM (AAA), UNSPECIFIED PART, UNSPECIFIED WHETHER RUPTURED: Primary | ICD-10-CM
